# Patient Record
Sex: MALE | Race: WHITE | NOT HISPANIC OR LATINO | Employment: UNEMPLOYED | URBAN - METROPOLITAN AREA
[De-identification: names, ages, dates, MRNs, and addresses within clinical notes are randomized per-mention and may not be internally consistent; named-entity substitution may affect disease eponyms.]

---

## 2017-01-04 ENCOUNTER — HOSPITAL ENCOUNTER (EMERGENCY)
Facility: HOSPITAL | Age: 22
Discharge: HOME | End: 2017-01-04
Attending: EMERGENCY MEDICINE
Payer: MEDICAID

## 2017-01-04 VITALS
HEART RATE: 102 BPM | TEMPERATURE: 100 F | SYSTOLIC BLOOD PRESSURE: 126 MMHG | BODY MASS INDEX: 20.46 KG/M2 | OXYGEN SATURATION: 98 % | RESPIRATION RATE: 18 BRPM | HEIGHT: 68 IN | DIASTOLIC BLOOD PRESSURE: 65 MMHG | WEIGHT: 135 LBS

## 2017-01-04 DIAGNOSIS — J10.1 INFLUENZA A: Primary | ICD-10-CM

## 2017-01-04 LAB
FLUAV AG SPEC QL IA: POSITIVE
FLUBV AG SPEC QL IA: NEGATIVE
SPECIMEN SOURCE: ABNORMAL

## 2017-01-04 PROCEDURE — 99283 EMERGENCY DEPT VISIT LOW MDM: CPT

## 2017-01-04 PROCEDURE — 25000003 PHARM REV CODE 250: Performed by: PHYSICIAN ASSISTANT

## 2017-01-04 PROCEDURE — 87400 INFLUENZA A/B EACH AG IA: CPT | Mod: 59

## 2017-01-04 RX ORDER — OSELTAMIVIR PHOSPHATE 75 MG/1
75 CAPSULE ORAL 2 TIMES DAILY
Qty: 10 CAPSULE | Refills: 0 | Status: SHIPPED | OUTPATIENT
Start: 2017-01-04 | End: 2017-01-09

## 2017-01-04 RX ORDER — ONDANSETRON 4 MG/1
4 TABLET, ORALLY DISINTEGRATING ORAL EVERY 6 HOURS PRN
Qty: 15 TABLET | Refills: 0 | Status: SHIPPED | OUTPATIENT
Start: 2017-01-04 | End: 2018-02-01

## 2017-01-04 RX ORDER — IBUPROFEN 600 MG/1
600 TABLET ORAL
Status: COMPLETED | OUTPATIENT
Start: 2017-01-04 | End: 2017-01-04

## 2017-01-04 RX ORDER — ONDANSETRON 4 MG/1
4 TABLET, ORALLY DISINTEGRATING ORAL
Status: COMPLETED | OUTPATIENT
Start: 2017-01-04 | End: 2017-01-04

## 2017-01-04 RX ADMIN — IBUPROFEN 600 MG: 600 TABLET, FILM COATED ORAL at 07:01

## 2017-01-04 RX ADMIN — ONDANSETRON 4 MG: 4 TABLET, ORALLY DISINTEGRATING ORAL at 06:01

## 2017-01-04 NOTE — ED AVS SNAPSHOT
[facility]  [address]  [place] [address]               Patel FRAZIER [place]   2017  6:14 PM   ED    Description:  Male : 1995   Department:  [facility]           Your Care was Coordinated By:     Provider Role From To    Homero Summers Jr., MD Attending Provider 17 --    Nell Delacruz PA-C Physician Assistant 17 --      Reason for Visit     flu like symptoms           Diagnoses this Visit        Comments    Influenza A    -  Primary       ED Disposition     None           To Do List           Follow-up Information     Follow up with Torin Crane DO.    Specialty:  Internal Medicine    Contact information:     [facility] [address]  [phone]         These Medications        Disp Refills Start End    oseltamivir (TAMIFLU) 75 MG capsule 10 capsule 0 2017    Take 1 capsule (75 mg total) by mouth 2 (two) times daily. - Oral    Pharmacy: [facility] [address] [place] [address] [place] [address] [address] Ph #: [phone]       ondansetron (ZOFRAN-ODT) 4 MG TbDL 15 tablet 0 2017     Take 1 tablet (4 mg total) by mouth every 6 (six) hours as needed. - Oral    Pharmacy: [facility] [address] [place] [address] [place] [address] Ph #: [phone]         [facility]     [facility] Care [facility] -  Assistance  Registered nurses in the [facility] provide clinical advisement, health education, appointment booking, and other advisory services.  Call for this free service at [phone].             Medications           Message regarding Medications     Verify the changes and/or additions to your medication regime listed below are the same as discussed with your clinician today.  If any of these changes or additions are incorrect, please notify your healthcare provider.        START taking  "these NEW medications        Refills    oseltamivir (TAMIFLU) 75 MG capsule 0    Sig: Take 1 capsule (75 mg total) by mouth 2 (two) times daily.    Class: Print    Route: Oral    ondansetron (ZOFRAN-ODT) 4 MG TbDL 0    Sig: Take 1 tablet (4 mg total) by mouth every 6 (six) hours as needed.    Class: Print    Route: Oral      These medications were administered today        Dose Freq    ondansetron disintegrating tablet 4 mg 4 mg ED 1 Time    Sig: Take 1 tablet (4 mg total) by mouth ED 1 Time.    Class: Normal    Route: Oral    Cosign for Ordering: Required by Homero Summers Jr., MD    ibuprofen tablet 600 mg 600 mg ED 1 Time    Sig: Take 1 tablet (600 mg total) by mouth ED 1 Time.    Class: Normal    Route: Oral    Cosign for Ordering: Required by Homero Summers Jr., MD      STOP taking these medications     diclofenac (VOLTAREN) 75 MG EC tablet Take 75 mg by mouth 2 (two) times daily as needed.           Verify that the below list of medications is an accurate representation of the medications you are currently taking.  If none reported, the list may be blank. If incorrect, please contact your healthcare provider. Carry this list with you in case of emergency.           Current Medications     BUTALBIT/ACETAMIN/CAFF/CODEINE (BUTALBITAL-ACETAMINOP-CAF-COD ORAL) Take 1 tablet by mouth every 6 (six) hours as needed.    ibuprofen tablet 600 mg Take 1 tablet (600 mg total) by mouth ED 1 Time.    ondansetron (ZOFRAN-ODT) 4 MG TbDL Take 1 tablet (4 mg total) by mouth every 6 (six) hours as needed.    oseltamivir (TAMIFLU) 75 MG capsule Take 1 capsule (75 mg total) by mouth 2 (two) times daily.           Clinical Reference Information           Your Vitals Were     BP Pulse Temp Resp Height Weight    126/65 (BP Location: Right arm, Patient Position: Sitting, BP Method: Automatic) 102 100.2 °F (37.9 °C) (Oral) 18 5' 8" (1.727 m) 61.2 kg (135 lb)    SpO2 BMI             98% 20.53 kg/m2         Allergies as of " 1/4/2017        Reactions    Amoxicillin Rash      Immunizations Administered on Date of Encounter - 1/4/2017     None      ED Micro, Lab, POCT     Start Ordered       Status Ordering Provider    01/04/17 1825 01/04/17 1824  Influenza antigen Nasal Swab  STAT      Final result       ED Imaging Orders     None      Discharge References/Attachments     INFLUENZA  (ENGLISH)      Smoking Cessation     If you would like to quit smoking:   You may be eligible for free services if you are a [place] resident and started smoking cigarettes before September 1, 1988.  Call the Smoking Cessation [employer] ([facility]) toll free at ([phone] [phone] or ([phone] 610-1699.   Call 5-844-QUIT-NOW if you do not meet the above criteria.             [facility] complies with applicable Federal civil rights laws and does not discriminate on the basis of race, color, national origin, age, disability, or sex.        Language Assistance Services     ATTENTION: Language assistance services are available, free of charge. Please call [phone].      ATENCIÓN: Si habla español, tiene a ceja disposición servicios gratuitos de asistencia lingüística. Llame al [phone].     CHÚ Ý: N?u b?n nói Ti?ng Vi?t, có các d?ch v? h? tr? ngôn ng? mi?n phí dành cho b?n. G?i s? [phone].

## 2017-01-05 NOTE — ED PROVIDER NOTES
Encounter Date: 1/4/2017       History     Chief Complaint   Patient presents with    flu like symptoms     subjective fever, vomiting, nausea, generalized body aches; states was around cousin with flu; began this morning; also c/o dizziness     Review of patient's allergies indicates:   Allergen Reactions    Amoxicillin Rash     HPI Comments: Patel Ghotra, a 21 y.o. male that presents to the ED for cough and body aches since last and N/V that started this morning.  Only treatments tried have been OTC nausea medications with no improvement of symptoms.  Denies sick contact.  He did receive the flu vaccination.        Patient is a 21 y.o. male presenting with the following complaint: vomiting. The history is provided by the patient.   Emesis    The current episode started today. The problem occurs 5 - 10 times per day. The problem has been gradually worsening. The emesis has an appearance of stomach contents and bilious material. Associated symptoms include chills, cough, a fever and myalgias. Pertinent negatives include no abdominal pain, no diarrhea, no headaches and no URI.     Past Medical History   Diagnosis Date    Dental infection      No past medical history pertinent negatives.  Past Surgical History   Procedure Laterality Date    [place] tooth extraction       Family History   Problem Relation Age of Onset    Diabetes Mother     Heart disease Father     Drug abuse Father     Diabetes Maternal Grandmother     Hypertension Maternal Grandmother      Social History   Substance Use Topics    Smoking status: Current Some Day Smoker     Types: Cigarettes    Smokeless tobacco: Never Used      Comment: every other day    Alcohol use 0.0 oz/week     0 Standard drinks or equivalent per week      Comment: social     Review of Systems   Constitutional: Positive for chills and fever.   HENT: Positive for congestion.    Respiratory: Positive for cough.    Gastrointestinal: Positive for nausea and vomiting.  Negative for abdominal pain and diarrhea.   Musculoskeletal: Positive for myalgias. Negative for neck pain.   Skin: Negative for color change and rash.   Allergic/Immunologic: Negative for immunocompromised state.   Neurological: Negative for dizziness and headaches.   Hematological: Does not bruise/bleed easily.   Psychiatric/Behavioral: Negative for agitation and confusion.   All other systems reviewed and are negative.      Physical Exam   Initial Vitals   BP Pulse Resp Temp SpO2   01/04/17 1651 01/04/17 1651 01/04/17 1651 01/04/17 1651 01/04/17 1651   123/63 110 18 100.2 °F (37.9 °C) 100 %     Physical Exam    ED Course   Procedures  Labs Reviewed   INFLUENZA A AND B ANTIGEN - Abnormal; Notable for the following:        Result Value    Influenza A Ag, EIA Positive (*)     All other components within normal limits             Medical Decision Making:   Initial Assessment:   Patel Ghotra, a 21 y.o. male that presents to the ED for cough and body aches since last and N/V that started this morning.  Only treatments tried have been OTC nausea medications with no improvement of symptoms.  Denies sick contact.  He did receive the flu vaccination.    Differential Diagnosis:   Influenza, viral uri, gastroenteritis   ED Management:  Zofran given with improvement of nausea.  Patient tolerating PO. Flu A positive.  Motrin given with improvement of HA.  Information on symptomatic treatment provided.  Patient was given strict protocol for return to ED and verbalized understanding.    RX: zofran, Tamiflu                    ED Course     Clinical Impression:   The encounter diagnosis was Influenza A.          Nell Delacruz PA-C  01/04/17 1931

## 2017-01-09 ENCOUNTER — HOSPITAL ENCOUNTER (OUTPATIENT)
Dept: RADIOLOGY | Facility: HOSPITAL | Age: 22
Discharge: HOME | End: 2017-01-09
Attending: INTERNAL MEDICINE
Payer: MEDICAID

## 2017-01-09 ENCOUNTER — OFFICE VISIT (OUTPATIENT)
Dept: INTERNAL MEDICINE | Facility: CLINIC | Age: 22
End: 2017-01-09
Payer: MEDICAID

## 2017-01-09 ENCOUNTER — TELEPHONE (OUTPATIENT)
Dept: INTERNAL MEDICINE | Facility: CLINIC | Age: 22
End: 2017-01-09

## 2017-01-09 VITALS
HEART RATE: 93 BPM | SYSTOLIC BLOOD PRESSURE: 124 MMHG | HEIGHT: 68 IN | BODY MASS INDEX: 19.75 KG/M2 | DIASTOLIC BLOOD PRESSURE: 76 MMHG | RESPIRATION RATE: 16 BRPM | WEIGHT: 130.31 LBS | TEMPERATURE: 98 F

## 2017-01-09 DIAGNOSIS — M79.671 RIGHT FOOT PAIN: ICD-10-CM

## 2017-01-09 DIAGNOSIS — N32.81 URGENCY-FREQUENCY SYNDROME: Primary | ICD-10-CM

## 2017-01-09 DIAGNOSIS — F32.A DEPRESSION, UNSPECIFIED DEPRESSION TYPE: ICD-10-CM

## 2017-01-09 DIAGNOSIS — N34.3 DYSURIA-FREQUENCY SYNDROME: ICD-10-CM

## 2017-01-09 DIAGNOSIS — R35.0 INCREASED FREQUENCY OF URINATION: Primary | ICD-10-CM

## 2017-01-09 PROCEDURE — 99999 PR PBB SHADOW E&M-EST. PATIENT-LVL III: CPT | Mod: PBBFAC,,, | Performed by: INTERNAL MEDICINE

## 2017-01-09 PROCEDURE — 73630 X-RAY EXAM OF FOOT: CPT | Mod: TC,PO,RT

## 2017-01-09 PROCEDURE — 73630 X-RAY EXAM OF FOOT: CPT | Mod: 26,RT,, | Performed by: RADIOLOGY

## 2017-01-09 PROCEDURE — 99214 OFFICE O/P EST MOD 30 MIN: CPT | Mod: S$PBB,,, | Performed by: INTERNAL MEDICINE

## 2017-01-09 RX ORDER — ESCITALOPRAM OXALATE 20 MG/1
TABLET ORAL
Qty: 30 TABLET | Refills: 0 | Status: SHIPPED | OUTPATIENT
Start: 2017-01-09 | End: 2017-05-10

## 2017-01-09 NOTE — TELEPHONE ENCOUNTER
----- Message from Hanna Castillo sent at 2017  9:50 AM CST -----  Please enter new Urology order. Order from 6/15 is .    Thanks

## 2017-01-09 NOTE — PROGRESS NOTES
Subjective:       Patient ID: Patel Ghotra is a 21 y.o. male.    Chief Complaint: Foot Pain    HPI   Pt here for evaluation of 1 month of decreased urinary stream, increased urgency, slight dysuria which has been intermittent. He denies any penile discharge, hematuria, hx of STDs.     Pt also admits to recurrent symptoms of depression related to his job and family stressors. Associated symptoms of anhedonia, excessive worrying. He denies any SI/HI. He would like to take to a therapist.   Review of Systems   Constitutional: Negative for activity change, appetite change, chills, diaphoresis, fatigue, fever and unexpected weight change.   HENT: Negative for postnasal drip, rhinorrhea, sinus pressure, sneezing, sore throat, trouble swallowing and voice change.    Respiratory: Negative for cough, shortness of breath and wheezing.    Cardiovascular: Negative for chest pain, palpitations and leg swelling.   Gastrointestinal: Negative for abdominal pain, blood in stool, constipation, diarrhea, nausea and vomiting.   Genitourinary: Positive for decreased urine volume, dysuria, frequency and urgency.   Musculoskeletal: Negative for arthralgias and myalgias.   Skin: Negative for rash and wound.   Allergic/Immunologic: Negative for environmental allergies and food allergies.   Hematological: Negative for adenopathy. Does not bruise/bleed easily.   Psychiatric/Behavioral: Positive for dysphoric mood.       Objective:      Physical Exam   Constitutional: He is oriented to person, place, and time. He appears well-developed and well-nourished. No distress.   HENT:   Head: Normocephalic and atraumatic.   Eyes: Conjunctivae and EOM are normal. Pupils are equal, round, and reactive to light. Right eye exhibits no discharge. Left eye exhibits no discharge. No scleral icterus.   Neck: Normal range of motion. Neck supple. No JVD present.   Cardiovascular: Normal rate, regular rhythm and normal heart sounds.    No murmur  heard.  Pulmonary/Chest: Effort normal and breath sounds normal. No respiratory distress. He has no wheezes. He has no rales.   Abdominal: Soft. Bowel sounds are normal. There is no tenderness.   Musculoskeletal: He exhibits no edema.        Feet:    Lymphadenopathy:     He has no cervical adenopathy.   Neurological: He is alert and oriented to person, place, and time.   Skin: Skin is warm and dry. No rash noted. He is not diaphoretic. No pallor.       Assessment:       1. Increased frequency of urination    2. Dysuria-frequency syndrome    3. Depression, unspecified depression type    4. Right foot pain        Plan:    1/2. Check UA/Urine Cx and for Chlamydia/gonorrhea          Referral to Urology    3. Rx Lexapro 20 mg daily       Referral to Psychology    4. X-ray foot       Continue NSAIDs PRN

## 2017-02-10 ENCOUNTER — TELEPHONE (OUTPATIENT)
Dept: UROLOGY | Facility: CLINIC | Age: 22
End: 2017-02-10

## 2017-05-10 ENCOUNTER — OFFICE VISIT (OUTPATIENT)
Dept: INTERNAL MEDICINE | Facility: CLINIC | Age: 22
End: 2017-05-10
Payer: MEDICAID

## 2017-05-10 VITALS
SYSTOLIC BLOOD PRESSURE: 112 MMHG | DIASTOLIC BLOOD PRESSURE: 62 MMHG | HEIGHT: 68 IN | WEIGHT: 140.44 LBS | RESPIRATION RATE: 16 BRPM | HEART RATE: 64 BPM | TEMPERATURE: 98 F | BODY MASS INDEX: 21.28 KG/M2

## 2017-05-10 DIAGNOSIS — F32.0 MILD SINGLE CURRENT EPISODE OF MAJOR DEPRESSIVE DISORDER: Primary | ICD-10-CM

## 2017-05-10 PROCEDURE — 99213 OFFICE O/P EST LOW 20 MIN: CPT | Mod: S$PBB,,, | Performed by: INTERNAL MEDICINE

## 2017-05-10 PROCEDURE — 99213 OFFICE O/P EST LOW 20 MIN: CPT | Mod: PBBFAC,PO | Performed by: INTERNAL MEDICINE

## 2017-05-10 PROCEDURE — 99999 PR PBB SHADOW E&M-EST. PATIENT-LVL III: CPT | Mod: PBBFAC,,, | Performed by: INTERNAL MEDICINE

## 2017-05-10 RX ORDER — BUPROPION HYDROCHLORIDE 150 MG/1
150 TABLET, EXTENDED RELEASE ORAL 2 TIMES DAILY
Qty: 60 TABLET | Refills: 0 | Status: SHIPPED | OUTPATIENT
Start: 2017-05-10 | End: 2017-06-14

## 2017-05-10 NOTE — PROGRESS NOTES
Subjective:       Patient ID: Patel Ghotra is a 21 y.o. male.    Chief Complaint: Follow-up    HPI   Pt here c/o generalized fatigue which has been present for the last few weeks. Associated symptoms of mood instability, excessive worrying, increased stress. He denies any fever/chills, weight loss, abdominal pain or recent illness. Pt was previously on Lexapro which he stopped.   Review of Systems   Constitutional: Positive for activity change. Negative for unexpected weight change.   HENT: Negative for hearing loss, rhinorrhea and trouble swallowing.    Eyes: Negative for discharge and visual disturbance.   Respiratory: Negative for chest tightness and wheezing.    Cardiovascular: Negative for chest pain and palpitations.   Gastrointestinal: Negative for blood in stool, constipation, diarrhea and vomiting.   Endocrine: Negative for polydipsia and polyuria.   Genitourinary: Negative for difficulty urinating, hematuria and urgency.   Musculoskeletal: Negative for arthralgias and joint swelling.   Neurological: Negative for weakness and headaches.   Psychiatric/Behavioral: Positive for dysphoric mood. Negative for confusion, self-injury and suicidal ideas. The patient is nervous/anxious.        Objective:      Physical Exam   Constitutional: He is oriented to person, place, and time. He appears well-developed and well-nourished. No distress.   HENT:   Head: Normocephalic and atraumatic.   Eyes: Conjunctivae and EOM are normal. Pupils are equal, round, and reactive to light. Right eye exhibits no discharge. Left eye exhibits no discharge. No scleral icterus.   Neck: Normal range of motion. Neck supple. No JVD present.   Cardiovascular: Normal rate, regular rhythm and normal heart sounds.    No murmur heard.  Pulmonary/Chest: Effort normal and breath sounds normal. No respiratory distress. He has no wheezes. He has no rales.   Abdominal: Soft. Bowel sounds are normal. There is no tenderness.   Musculoskeletal:  He exhibits no edema.   Lymphadenopathy:     He has no cervical adenopathy.   Neurological: He is alert and oriented to person, place, and time.   Skin: Skin is warm and dry. No rash noted. He is not diaphoretic. No pallor.       Assessment:       1. Mild single current episode of major depressive disorder        Plan:    1. Rx Wellbutrin  mg BID   2. F/u in 1 month for depression

## 2017-05-26 ENCOUNTER — PATIENT MESSAGE (OUTPATIENT)
Dept: INTERNAL MEDICINE | Facility: CLINIC | Age: 22
End: 2017-05-26

## 2017-06-14 ENCOUNTER — OFFICE VISIT (OUTPATIENT)
Dept: INTERNAL MEDICINE | Facility: CLINIC | Age: 22
End: 2017-06-14
Payer: MEDICAID

## 2017-06-14 VITALS
BODY MASS INDEX: 21.15 KG/M2 | HEART RATE: 88 BPM | HEIGHT: 68 IN | DIASTOLIC BLOOD PRESSURE: 61 MMHG | WEIGHT: 139.56 LBS | SYSTOLIC BLOOD PRESSURE: 116 MMHG | RESPIRATION RATE: 16 BRPM | TEMPERATURE: 98 F

## 2017-06-14 DIAGNOSIS — F32.0 MILD SINGLE CURRENT EPISODE OF MAJOR DEPRESSIVE DISORDER: Primary | ICD-10-CM

## 2017-06-14 DIAGNOSIS — M25.541 JOINT PAIN IN FINGERS OF RIGHT HAND: ICD-10-CM

## 2017-06-14 PROCEDURE — 99999 PR PBB SHADOW E&M-EST. PATIENT-LVL III: CPT | Mod: PBBFAC,,, | Performed by: INTERNAL MEDICINE

## 2017-06-14 PROCEDURE — 99213 OFFICE O/P EST LOW 20 MIN: CPT | Mod: PBBFAC,PO | Performed by: INTERNAL MEDICINE

## 2017-06-14 PROCEDURE — 99214 OFFICE O/P EST MOD 30 MIN: CPT | Mod: S$PBB,,, | Performed by: INTERNAL MEDICINE

## 2017-06-14 RX ORDER — MELOXICAM 15 MG/1
15 TABLET ORAL DAILY
Qty: 30 TABLET | Refills: 3 | Status: SHIPPED | OUTPATIENT
Start: 2017-06-14 | End: 2017-12-02 | Stop reason: SDUPTHER

## 2017-06-14 NOTE — PROGRESS NOTES
Subjective:       Patient ID: Patel Ghotra is a 21 y.o. male.    Chief Complaint: Follow-up    HPI   Pt with MDD is here for 1 month f/u. He was started on Wellbutrin which he stopped 2/2 SE's after one week. Pt overall is felling better off the medication.     Pt also c/o right 3rd MCP joint pain which started after hitting a wall. Associated symptoms of swelling and the pain is described as sharp when trying to make a fist. No decreased ROM.   Review of Systems   Constitutional: Negative for activity change, appetite change, chills, diaphoresis, fatigue, fever and unexpected weight change.   HENT: Negative for postnasal drip, rhinorrhea, sinus pressure, sneezing, sore throat, trouble swallowing and voice change.    Respiratory: Negative for cough, shortness of breath and wheezing.    Cardiovascular: Negative for chest pain, palpitations and leg swelling.   Gastrointestinal: Negative for abdominal pain, blood in stool, constipation, diarrhea, nausea and vomiting.   Genitourinary: Negative for dysuria.   Musculoskeletal: Negative for arthralgias and myalgias.   Skin: Negative for rash and wound.   Allergic/Immunologic: Negative for environmental allergies and food allergies.   Hematological: Negative for adenopathy. Does not bruise/bleed easily.   Psychiatric/Behavioral: Positive for dysphoric mood.       Objective:      Physical Exam   Constitutional: He is oriented to person, place, and time. He appears well-developed and well-nourished. No distress.   HENT:   Head: Normocephalic and atraumatic.   Eyes: Conjunctivae and EOM are normal. Pupils are equal, round, and reactive to light. Right eye exhibits no discharge. Left eye exhibits no discharge. No scleral icterus.   Neck: Normal range of motion. Neck supple. No JVD present.   Cardiovascular: Normal rate, regular rhythm and normal heart sounds.    No murmur heard.  Pulmonary/Chest: Effort normal and breath sounds normal. No respiratory distress. He has  no wheezes. He has no rales.   Musculoskeletal: He exhibits no edema.        Hands:  Mild swelling, normal ROM   Lymphadenopathy:     He has no cervical adenopathy.   Neurological: He is alert and oriented to person, place, and time. He has normal reflexes.   Skin: Skin is warm and dry. No rash noted. He is not diaphoretic. No pallor.       Assessment:       1. Mild single current episode of major depressive disorder    2. Joint pain in fingers of right hand        Plan:    1. Stable off meds   2. Rx Mobic 15 mg daily PRN

## 2017-12-04 RX ORDER — MELOXICAM 15 MG/1
TABLET ORAL
Qty: 30 TABLET | Refills: 3 | Status: SHIPPED | OUTPATIENT
Start: 2017-12-04 | End: 2018-05-31

## 2018-02-01 ENCOUNTER — OFFICE VISIT (OUTPATIENT)
Dept: INTERNAL MEDICINE | Facility: CLINIC | Age: 23
End: 2018-02-01
Payer: MEDICAID

## 2018-02-01 VITALS
WEIGHT: 142 LBS | DIASTOLIC BLOOD PRESSURE: 62 MMHG | TEMPERATURE: 98 F | HEART RATE: 76 BPM | HEIGHT: 68 IN | SYSTOLIC BLOOD PRESSURE: 114 MMHG | BODY MASS INDEX: 21.52 KG/M2

## 2018-02-01 DIAGNOSIS — G43.009 MIGRAINE WITHOUT AURA AND WITHOUT STATUS MIGRAINOSUS, NOT INTRACTABLE: ICD-10-CM

## 2018-02-01 DIAGNOSIS — F41.9 ANXIETY: ICD-10-CM

## 2018-02-01 DIAGNOSIS — Z00.00 ANNUAL PHYSICAL EXAM: Primary | ICD-10-CM

## 2018-02-01 PROBLEM — F32.0 MILD SINGLE CURRENT EPISODE OF MAJOR DEPRESSIVE DISORDER: Status: RESOLVED | Noted: 2017-05-10 | Resolved: 2018-02-01

## 2018-02-01 PROCEDURE — 99395 PREV VISIT EST AGE 18-39: CPT | Mod: S$PBB,,, | Performed by: INTERNAL MEDICINE

## 2018-02-01 PROCEDURE — 99999 PR PBB SHADOW E&M-EST. PATIENT-LVL III: CPT | Mod: PBBFAC,,, | Performed by: INTERNAL MEDICINE

## 2018-02-01 PROCEDURE — 99213 OFFICE O/P EST LOW 20 MIN: CPT | Mod: PBBFAC,PO | Performed by: INTERNAL MEDICINE

## 2018-02-01 RX ORDER — HYDROXYZINE HYDROCHLORIDE 25 MG/1
25 TABLET, FILM COATED ORAL 3 TIMES DAILY PRN
Qty: 60 TABLET | Refills: 2 | Status: SHIPPED | OUTPATIENT
Start: 2018-02-01 | End: 2020-11-10

## 2018-02-01 NOTE — PROGRESS NOTES
Subjective:       Patient ID: Patel Ghotra is a 22 y.o. male.    Chief Complaint: Annual Exam    HPI   22 y.o. Male here for annual exam.      Cholesterol: (normal)  Vaccines: Influenza (2016); Tetanus (2015)   Eye exam: 2014     Mobility: normal  Falls: no     Exercise: cardio  Diet: regular     Past Medical History:  Dental infection   Past Surgical History:  WISDOM TOOTH EXTRACTION   Social History  Marital Status: Single Spouse Name:   Years of Education: Number of children:      Occupational History  Occupation Employer Comment   Student      Social History Main Topics  Smoking Status: Current Every Day Smoker Packs/Day: 0.00 Years:   Types: Cigarettes  Smokeless Status: Not on file   Alcohol Use: Yes   Comment: social  Drug Use: No   Sexual Activity: Yes Partners with: Female  Birth Control/Protection: Condom     Review of Systems   Constitutional: Negative for activity change, appetite change, chills, diaphoresis, fatigue, fever and unexpected weight change.   HENT: Negative for congestion, mouth sores, postnasal drip, rhinorrhea, sinus pressure, sneezing, sore throat, trouble swallowing and voice change.    Eyes: Negative for discharge, itching and visual disturbance.   Respiratory: Negative for cough, chest tightness, shortness of breath and wheezing.    Cardiovascular: Negative for chest pain, palpitations and leg swelling.   Gastrointestinal: Negative for abdominal pain, blood in stool, constipation, diarrhea, nausea and vomiting.   Endocrine: Negative for cold intolerance and heat intolerance.   Genitourinary: Negative for difficulty urinating, dysuria, flank pain, hematuria and urgency.   Musculoskeletal: Negative for arthralgias, back pain, myalgias and neck pain.   Skin: Negative for rash and wound.   Allergic/Immunologic: Negative for environmental allergies and food allergies.   Neurological: Positive for headaches. Negative for dizziness, tremors, seizures, syncope and weakness.    Hematological: Negative for adenopathy. Does not bruise/bleed easily.   Psychiatric/Behavioral: Negative for confusion, self-injury, sleep disturbance and suicidal ideas. The patient is nervous/anxious.        Objective:      Physical Exam   Constitutional: He is oriented to person, place, and time. He appears well-developed and well-nourished. No distress.   HENT:   Head: Normocephalic and atraumatic.   Right Ear: External ear normal.   Left Ear: External ear normal.   Nose: Nose normal.   Mouth/Throat: Oropharynx is clear and moist. No oropharyngeal exudate.   Eyes: Conjunctivae and EOM are normal. Pupils are equal, round, and reactive to light. Right eye exhibits no discharge. Left eye exhibits no discharge. No scleral icterus.   Neck: Normal range of motion. Neck supple. No JVD present. No thyromegaly present.   Cardiovascular: Normal rate, regular rhythm, normal heart sounds and intact distal pulses.    No murmur heard.  Pulmonary/Chest: Effort normal and breath sounds normal. No respiratory distress. He has no wheezes. He has no rales.   Abdominal: Soft. Bowel sounds are normal. He exhibits no distension. There is no tenderness. There is no guarding.   Musculoskeletal: He exhibits no edema.   Lymphadenopathy:     He has no cervical adenopathy.   Neurological: He is alert and oriented to person, place, and time. No cranial nerve deficit. Coordination normal.   Skin: Skin is warm and dry. No rash noted. He is not diaphoretic. No pallor.   Psychiatric: He has a normal mood and affect. Judgment normal.       Assessment:       1. Annual physical exam    2. Migraine without aura and without status migrainosus, not intractable    3. Anxiety        Plan:    Blood work reviewed with pt   Vaccines: Influenza (2017); Tetanus (2015)    Eye exam: 2014      1. Migraines- stable on Fioricet PRN   2. Anxiety- Rx Atarax 25 mg TID PRN   3. F/u in 1 yr or PRN

## 2018-02-10 ENCOUNTER — LAB VISIT (OUTPATIENT)
Dept: LAB | Facility: HOSPITAL | Age: 23
End: 2018-02-10
Attending: INTERNAL MEDICINE
Payer: MEDICAID

## 2018-02-10 DIAGNOSIS — Z00.00 ANNUAL PHYSICAL EXAM: ICD-10-CM

## 2018-02-10 LAB
ALBUMIN SERPL BCP-MCNC: 4.3 G/DL
ALP SERPL-CCNC: 97 U/L
ALT SERPL W/O P-5'-P-CCNC: 19 U/L
ANION GAP SERPL CALC-SCNC: 9 MMOL/L
AST SERPL-CCNC: 27 U/L
BASOPHILS # BLD AUTO: 0.03 K/UL
BASOPHILS NFR BLD: 0.6 %
BILIRUB SERPL-MCNC: 1.2 MG/DL
BUN SERPL-MCNC: 15 MG/DL
CALCIUM SERPL-MCNC: 9.9 MG/DL
CHLORIDE SERPL-SCNC: 100 MMOL/L
CHOLEST SERPL-MCNC: 168 MG/DL
CHOLEST/HDLC SERPL: 3.2 {RATIO}
CO2 SERPL-SCNC: 29 MMOL/L
CREAT SERPL-MCNC: 1.1 MG/DL
DIFFERENTIAL METHOD: ABNORMAL
EOSINOPHIL # BLD AUTO: 0.2 K/UL
EOSINOPHIL NFR BLD: 3.7 %
ERYTHROCYTE [DISTWIDTH] IN BLOOD BY AUTOMATED COUNT: 12.4 %
EST. GFR  (AFRICAN AMERICAN): >60 ML/MIN/1.73 M^2
EST. GFR  (NON AFRICAN AMERICAN): >60 ML/MIN/1.73 M^2
GLUCOSE SERPL-MCNC: 110 MG/DL
HCT VFR BLD AUTO: 42.8 %
HDLC SERPL-MCNC: 52 MG/DL
HDLC SERPL: 31 %
HGB BLD-MCNC: 14.7 G/DL
IMM GRANULOCYTES # BLD AUTO: 0.01 K/UL
IMM GRANULOCYTES NFR BLD AUTO: 0.2 %
LDLC SERPL CALC-MCNC: 101.8 MG/DL
LYMPHOCYTES # BLD AUTO: 2.5 K/UL
LYMPHOCYTES NFR BLD: 50.4 %
MCH RBC QN AUTO: 30.2 PG
MCHC RBC AUTO-ENTMCNC: 34.3 G/DL
MCV RBC AUTO: 88 FL
MONOCYTES # BLD AUTO: 0.4 K/UL
MONOCYTES NFR BLD: 8.6 %
NEUTROPHILS # BLD AUTO: 1.8 K/UL
NEUTROPHILS NFR BLD: 36.5 %
NONHDLC SERPL-MCNC: 116 MG/DL
NRBC BLD-RTO: 0 /100 WBC
PLATELET # BLD AUTO: 245 K/UL
PMV BLD AUTO: 10.3 FL
POTASSIUM SERPL-SCNC: 4.2 MMOL/L
PROT SERPL-MCNC: 7.7 G/DL
RBC # BLD AUTO: 4.86 M/UL
SODIUM SERPL-SCNC: 138 MMOL/L
TRIGL SERPL-MCNC: 71 MG/DL
TSH SERPL DL<=0.005 MIU/L-ACNC: 0.62 UIU/ML
WBC # BLD AUTO: 4.9 K/UL

## 2018-02-10 PROCEDURE — 80053 COMPREHEN METABOLIC PANEL: CPT

## 2018-02-10 PROCEDURE — 86703 HIV-1/HIV-2 1 RESULT ANTBDY: CPT

## 2018-02-10 PROCEDURE — 86592 SYPHILIS TEST NON-TREP QUAL: CPT

## 2018-02-10 PROCEDURE — 80074 ACUTE HEPATITIS PANEL: CPT

## 2018-02-10 PROCEDURE — 36415 COLL VENOUS BLD VENIPUNCTURE: CPT | Mod: PO

## 2018-02-10 PROCEDURE — 85025 COMPLETE CBC W/AUTO DIFF WBC: CPT

## 2018-02-10 PROCEDURE — 86694 HERPES SIMPLEX NES ANTBDY: CPT | Mod: 59

## 2018-02-10 PROCEDURE — 84443 ASSAY THYROID STIM HORMONE: CPT

## 2018-02-10 PROCEDURE — 80061 LIPID PANEL: CPT

## 2018-02-10 PROCEDURE — 86694 HERPES SIMPLEX NES ANTBDY: CPT

## 2018-02-12 LAB
HAV IGM SERPL QL IA: NEGATIVE
HBV CORE IGM SERPL QL IA: NEGATIVE
HBV SURFACE AG SERPL QL IA: NEGATIVE
HCV AB SERPL QL IA: NEGATIVE
HIV 1+2 AB+HIV1 P24 AG SERPL QL IA: NEGATIVE
RPR SER QL: NORMAL

## 2018-02-14 LAB — HSV AB, IGM BY EIA: REACTIVE

## 2018-02-15 LAB — HSV AB, IGM BY IFA: NEGATIVE

## 2018-05-31 ENCOUNTER — OFFICE VISIT (OUTPATIENT)
Dept: INTERNAL MEDICINE | Facility: CLINIC | Age: 23
End: 2018-05-31
Payer: MEDICAID

## 2018-05-31 ENCOUNTER — HOSPITAL ENCOUNTER (OUTPATIENT)
Dept: RADIOLOGY | Facility: HOSPITAL | Age: 23
Discharge: HOME | End: 2018-05-31
Attending: INTERNAL MEDICINE
Payer: MEDICAID

## 2018-05-31 VITALS
HEART RATE: 62 BPM | BODY MASS INDEX: 20.89 KG/M2 | SYSTOLIC BLOOD PRESSURE: 118 MMHG | TEMPERATURE: 98 F | WEIGHT: 137.81 LBS | HEIGHT: 68 IN | DIASTOLIC BLOOD PRESSURE: 62 MMHG

## 2018-05-31 DIAGNOSIS — G89.29 CHRONIC RIGHT-SIDED LOW BACK PAIN WITHOUT SCIATICA: Primary | ICD-10-CM

## 2018-05-31 DIAGNOSIS — G89.29 CHRONIC RIGHT-SIDED LOW BACK PAIN WITHOUT SCIATICA: ICD-10-CM

## 2018-05-31 DIAGNOSIS — M62.830 MUSCLE SPASM OF BACK: ICD-10-CM

## 2018-05-31 DIAGNOSIS — M54.50 CHRONIC RIGHT-SIDED LOW BACK PAIN WITHOUT SCIATICA: Primary | ICD-10-CM

## 2018-05-31 DIAGNOSIS — M54.50 CHRONIC RIGHT-SIDED LOW BACK PAIN WITHOUT SCIATICA: ICD-10-CM

## 2018-05-31 PROCEDURE — 99214 OFFICE O/P EST MOD 30 MIN: CPT | Mod: S$PBB,,, | Performed by: INTERNAL MEDICINE

## 2018-05-31 PROCEDURE — 99213 OFFICE O/P EST LOW 20 MIN: CPT | Mod: PBBFAC,PO | Performed by: INTERNAL MEDICINE

## 2018-05-31 PROCEDURE — 99999 PR PBB SHADOW E&M-EST. PATIENT-LVL III: CPT | Mod: PBBFAC,,, | Performed by: INTERNAL MEDICINE

## 2018-05-31 PROCEDURE — 72110 X-RAY EXAM L-2 SPINE 4/>VWS: CPT | Mod: 26,,, | Performed by: RADIOLOGY

## 2018-05-31 PROCEDURE — 72110 X-RAY EXAM L-2 SPINE 4/>VWS: CPT | Mod: TC,PO

## 2018-05-31 RX ORDER — NAPROXEN 500 MG/1
500 TABLET ORAL 2 TIMES DAILY WITH MEALS
Qty: 60 TABLET | Refills: 3 | Status: SHIPPED | OUTPATIENT
Start: 2018-05-31 | End: 2018-06-30

## 2018-05-31 NOTE — PROGRESS NOTES
Subjective:       Patient ID: Patel Ghotra is a 22 y.o. male.    Chief Complaint: Low-back Pain    HPI   Pt also c/o intermittent right sided low back pain described as sharp/stabbing at times. There is no radiation of the pain down the legs. Some relief with Naproxen. No hx of trauma to the area.   Review of Systems   Constitutional: Positive for activity change. Negative for appetite change, chills, diaphoresis, fatigue, fever and unexpected weight change.   HENT: Positive for rhinorrhea. Negative for hearing loss, postnasal drip, sinus pressure, sneezing, sore throat, trouble swallowing and voice change.    Eyes: Negative for discharge and visual disturbance.   Respiratory: Negative for cough, chest tightness, shortness of breath and wheezing.    Cardiovascular: Negative for chest pain, palpitations and leg swelling.   Gastrointestinal: Positive for constipation. Negative for abdominal pain, blood in stool, diarrhea, nausea and vomiting.   Endocrine: Negative for polydipsia and polyuria.   Genitourinary: Negative for difficulty urinating, dysuria, hematuria and urgency.   Musculoskeletal: Positive for arthralgias. Negative for joint swelling, myalgias and neck pain.   Skin: Negative for rash and wound.   Allergic/Immunologic: Negative for environmental allergies and food allergies.   Neurological: Positive for weakness and headaches.   Hematological: Negative for adenopathy. Does not bruise/bleed easily.   Psychiatric/Behavioral: Positive for dysphoric mood. Negative for confusion.       Objective:      Physical Exam   Constitutional: He is oriented to person, place, and time. He appears well-developed and well-nourished. No distress.   HENT:   Head: Normocephalic and atraumatic.   Eyes: Conjunctivae and EOM are normal. Pupils are equal, round, and reactive to light. Right eye exhibits no discharge. Left eye exhibits no discharge. No scleral icterus.   Neck: Normal range of motion. Neck supple. No JVD  present.   Cardiovascular: Normal rate, regular rhythm and normal heart sounds.    No murmur heard.  Pulmonary/Chest: Effort normal and breath sounds normal. No respiratory distress. He has no wheezes. He has no rales.   Musculoskeletal: He exhibits no edema.        Back:    Lymphadenopathy:     He has no cervical adenopathy.   Neurological: He is alert and oriented to person, place, and time.   Skin: Skin is warm and dry. No rash noted. He is not diaphoretic. No pallor.       Assessment:       1. Chronic right-sided low back pain without sciatica    2. Muscle spasm of back        Plan:    1. X-ray Lumbar spine       Rx Naproxen 500 mg BID PRN       Referral to PT

## 2018-06-01 ENCOUNTER — CLINICAL SUPPORT (OUTPATIENT)
Dept: REHABILITATION | Facility: HOSPITAL | Age: 23
End: 2018-06-01
Attending: INTERNAL MEDICINE
Payer: MEDICAID

## 2018-06-01 DIAGNOSIS — R53.1 WEAKNESS: ICD-10-CM

## 2018-06-01 DIAGNOSIS — M53.3 SACROILIAC DYSFUNCTION: ICD-10-CM

## 2018-06-01 PROCEDURE — 97162 PT EVAL MOD COMPLEX 30 MIN: CPT | Mod: PO

## 2018-06-01 PROCEDURE — 97110 THERAPEUTIC EXERCISES: CPT | Mod: PO

## 2018-06-01 NOTE — PLAN OF CARE
"Physical Therapy Evaluation    Name: Patel Owusu [facility] Number: [id number]    Diagnosis:   Encounter Diagnoses   Name Primary?    Weakness     Sacroiliac dysfunction      Physician: Torin Crane, *  Treatment Orders: PT Eval and Treat    History     Past Medical History:   Diagnosis Date    Dental infection      Current Outpatient Prescriptions   Medication Sig    BUTALBIT/ACETAMIN/CAFF/CODEINE (BUTALBITAL-ACETAMINOP-CAF-COD ORAL) Take 1 tablet by mouth every 6 (six) hours as needed.    hydrOXYzine HCl (ATARAX) 25 MG tablet Take 1 tablet (25 mg total) by mouth 3 (three) times daily as needed for Anxiety.    naproxen (NAPROSYN) 500 MG tablet Take 1 tablet (500 mg total) by mouth 2 (two) times daily with meals.     No current facility-administered medications for this visit.      Review of patient's allergies indicates:   Allergen Reactions    Amoxicillin Rash       Evaluation Date: 6/1/18  Visit # authorized: 1/  Authorization period: 6/30/18  Plan of care expiration: 8/1/18    Subjective     History of present condition:  Patel is a 22 y.o. male that presents to [facility] secondary to chronic low back pain. Injury/surgery occurred approximately: ~6 years ago. Pt. presents with the following co-morbidities and personal factors that directly impact his plan of care: chronicity of condition.        Precautions: none    Onset/IZZY: gradual: Pt. has a chronic hx of lumbar spine pain of that started in high school more than likely related to carrying heavy drums on his shoulders daily. Pt. also recalls a fall at that time landing on spine causing him to jar his spine. Pt. went to the ER and was dx with a cervical sprain per pt. report. Current symptoms include with back stiffness/pain in the AM, pain gets progressively worse throughout the day, and exacerbates at the end of the day causing a "searing pain."  Red Flags:  · Bowel/bladder symptoms (urinary retention/fecal " incontinence)? No  · Recent weight loss? No.  · Constant/Night pain that is unchanging with change of position? No.  · PMH of CA? No.   · Numbness or Tingling? in low back intermittently with increased stress.    Aggravating factors: high level of stress, movement can causing a pinching sensation, bending/stooping, lifting mod. to heavy objects, sleep disturbance   Relieving factors: NSAIDs and rest  Pain Scale: Patel rates pain on a scale of 0-10 to be 3 at currently; 0 at best; 10 at worst.    Diagnostic tests: Lumbar x-ray revealed unremarkable.    PLOF: work related activities, gym workout 1x/wk: cardio, UE/LE weights; lives with roommates  DME own/use: none  Occupation: Pt works as a  and job related duties include lifting ice buckets, plates, running back/forth, carrying a tray one side of body (tries to alternate sides)    Previous treatment: medication.  ADLs: Pt has a decrease ability to perform ADLs such as see above.    Patient Goals: Pt would like to decrease pain and increase function so he can return to his normal daily activities with less stiffness, become more active/exercise, and return to PLOF.    Objective     Observation: standing: right ilium higher than left; supine: right ilial anterior/left ilial posterior    Posture: subcranial hyperextension, cervical flattened lordosis, FHP, mild Tspine kyphosis, rolled anterior/IR shoulders, shoulder protraction    Lumbar ROM: (measured in degrees)    Degrees Quality   Flexion WFL   fattening L4/5 L5/S1 repeated notes improvement in pain   Extension WFL   ERP, increases with repetition   Left Side Bending 50% right LBP   Right Side Bending WFL    Left rotation 50% decreased PSIS depressoin   Right Rotation 75%    Dermatomes: (impaired/normal)     RLE LLE   L2 Intact Intact   L3 Intact Intact   L4 Intact Intact   L5 Intact Intact   S1 Intact Intact     Reflexes: L3/S1: 2 bilaterally    Lower Extremity Strength (graded 0-5 out of 5)    RLE LLE   Hip flexion: 4-/5 4+/5   Hip ER 4+/5 4-/5   Hip IR 4+/5 4-/5   Knee extension: 4+/5 4+/5   Ankle dorsiflexion: 5/5 5/5   Great toe extension: 4-/5 4-/5   Posterior fibers of Gluteus medius 4+/5 4+/5   Knee flexion 4+/5 4+/5   Ankle plantarflexion 5/5 5/5   Hip extension: 3-/5 3/5     Special Tests: ((+): pos.; (-): neg.)   · Quadrant test: -  · SLR Test: bilateral 60 deg.   · Bridge Test: + left  · Pirformis Test: -; mod. tightness left hip, mild left    Flexibility:   · Popliteal Angle: R = 55 degrees ; L = 55 degrees    Palpation for condition:   · Position: see observation  · Warmth: normal  · Swelling: none  · Texture: hypertonic bilateral hamstrings and piriformis left greater than right     Joint Mobility: (graded 0-6 out of 6) sacrum flexion/extension: 2/6; bilateral lumbar p/a: 2/6; Tspine: 2/6    Functional Status Measures:    Intake Score     Pts Physical FS Primary Measure      NP due to pt. being 30 min. late                          Risk Adjustment Statistical FOTO     NP        PT reviewed FOTO scores for Patel Ghotra on 06/01/2018.   FOTO scores were entered into [id number] - see media section.    History  Co-morbidities and personal factors that may impact the plan of care Examination  Body Structures and Functions, activity limitations and participation restrictions that may impact the plan of care Clinical Presentation   Decision Making/ Complexity Score   Co-morbidities:   sacroiliac dysfunction and chronicity of conditiion            Personal Factors:   none Body Regions: SIJ dysfunction and lumbar spine    Body Systems: Decreased AROM; pelvic dysfunction; core hip lumbopelvic weakness; poor posture; pain with transitional activities, decrease exercise ability, and pain with ADLs.     Activity limitations: bending/stooping, prolonged sitting/standing, lifting, work related activities, and sleep disturbance    Participation Restrictions: leisure work out evolving with changing  "clinical characteristics   moderate     Clinical Presentation/complexity category  Moderate complexity category: pt. has 1-2 personal factors and/or co-morbidities directly  impacting POC, 3 or more body system impairments/functional limitations/participation restrictions; as well as, condition is evolving with changing clinical characteristics.    PT Evaluation Completed? Yes  Discussed Plan of Care with patient: Yes    TREATMENT:  Therapeutic exercise: Patel received therapeutic exercises to develop strength and endurance, flexibility for 10 minutes including: View at "[email or web address]" using code: [email or web address]  *Pt was advised to perform these exercises free of pain, and discontinue use if symptoms persist/worsen.    Pt. Education: Instructed pt. regarding:body mechanics, posture, activity modification/avoidance, and proper technique with all exercises. Pt. to demonstrate good understanding of the education provided. Patel demonstrated good return demonstration of activities. No cultural, environmental, or spiritual barriers identified to treatment or learning.    Medical necessity is demonstrated by the following IMPAIRMENTS/PROBLEM LIST:   1) Pain limiting function   2) Posture dysfunction   3) Core/Lumbar/LE weakness   4) Decreased thoracic/lumbar joint mobility   5) Decreased Lumbar ROM   6) Decreased soft tissue extensibility/fascia restriction   7) Decreased LE flexibility: hamstrings and piriformis left greater than right   8) Lack of HEP   9) LE paresthesia intermittently right   10) Sacroiliac dysfunction    GOALS:   Short Term Goals:  4 weeks  1. Report decreased lumbar pain </= 5/10 at worst to increase tolerance for prolonged sitting/standing  2. Pt. to demonstrate proper cervical and scapula retraction requiring min. to no verbal cues from PT  3. Increase thoracic joint mobility to 2+/6 to promote greater ease with self care skills  4. Increase lumbar joint mobility to 2+/6 to promote greater " ease with prolonged sitting/standing  5. Pt. to demonstrate increased MMT for core/lumbar paraspinals to 3/5 to increase endurance with prolonged sitting/standing.  6. Pt to tolerate HEP to improve ROM and independence with ADL's  7. Pt. to be independent in SIJ correction requiring no verbal cues for demonstration    Long Term Goals: 8 weeks  1. Report decreased lumbar pain </=  2/10 at worst to increase tolerance for work related activities  2. Pt. to demonstrate proper cervical and scapula retraction requiring no verbal cues from PT  3. Increase thoracic joint mobility to 3/6 to promote greater ease with self care skills  4. Increase lumbar joint mobility to 3/6 to promote greater ease with prolonged sitting/standing  5. Pt. to demonstrate increased MMT for core/lumbar paraspinals to 3+/5 to increase endurance with prolonged sitting.   6. Pt. to demonstrate increased MMT for bilateral gluteus medius to 5/5  to increase stability during ambulation on uneven surfaces.  7. Pt. to demonstrate increased MMT for bilateral hip flexor to 5/5 to increase tolerance for ADL and work activities.   8. Pt to be independent with HEP to improve ROM and independence with ADL's  9. Pt. to be independent in symptom management  Assessment   This is a 22 y.o. male referred to outpatient physical therapy who presents with a medical diagnosis of chronic right sided LBP and PT diagnosis of weakness, SIJ dysfunction demonstrating joint dysfunction and functional limitation as described above. Level of complexity is moderate;  based on patient's past medical history including the above co-morbidities and personal factors; functional limitations, and clinical presentation directly impacting his/her plan of care. Pt demonstrates good rehab potential.    Patient presents with signs and symptoms consistent with lumbar spine mechanical dysfunction and SIJ dysfunction.Patient was in agreement with set goals and plan of care. . Pt will benefit  from physical therapy services in order to maximize pain free functional independence.     Plan     Pt will be treated by physical therapy 1-3 times a week for 8 weeks for pt. education, HEP, aquatic therapy if land based regimen is not tolerable, therapeutic exercises, neuromuscular re-education, soft tissue and joint mobilizations; and modalities, including but not exclusive to dry needling, prn to achieve established goals. Patel may at times be seen by a PTA as part of the Rehab Team.     I certify the need for these services furnished under this plan of treatment and while under my care.______________________________ Physician/Referring Practitioner  Date of Signature

## 2018-06-08 ENCOUNTER — CLINICAL SUPPORT (OUTPATIENT)
Dept: REHABILITATION | Facility: HOSPITAL | Age: 23
End: 2018-06-08
Attending: INTERNAL MEDICINE
Payer: MEDICAID

## 2018-06-08 DIAGNOSIS — M53.3 SACROILIAC DYSFUNCTION: ICD-10-CM

## 2018-06-08 DIAGNOSIS — R53.1 WEAKNESS: ICD-10-CM

## 2018-06-08 PROCEDURE — 97110 THERAPEUTIC EXERCISES: CPT | Mod: PO

## 2018-06-08 PROCEDURE — 97140 MANUAL THERAPY 1/> REGIONS: CPT | Mod: PO

## 2018-06-08 NOTE — PROGRESS NOTES
"                                                    Physical Therapy Progress Note     Name: Patel Owusu [facility] Number: [id number]  Diagnosis:   Encounter Diagnoses   Name Primary?    Weakness     Sacroiliac dysfunction      Physician: Torin Crane, *  Treatment Orders: PT Eval and Treat  Past Medical History:   Diagnosis Date    Dental infection        Precautions: standard    Evaluation Date: 6/1/18  Visit # authorized: 2/6  Authorization period: 6/30/18  Plan of care expiration: 8/1/18  Referring Provider: Torin Crane, *    Subjective     Pt reports: he may have stretched wrong so his right low back is bothering him right now.     Pain Scale: before treatment: 3 currently; after treatment: 2    Objective     ROM: before treatment: ; after treatment: NT    Patient received individual therapy to increase strength, endurance, ROM, flexibility, posture and core stabilization with 1 patients with activities as follows:     Therapeutic exercise: Patel received therapeutic exercises to develop strength, endurance, ROM, flexibility, posture and core stabilization for 25 minutes including:     Supine:   · bilateral piriformis stretch: 3x30 sec.  · MET right ilial anterior/left posterior ilial rotation: 3x5"  · sciatic nerve glide: 3x10 (10 rep increments)  · TA brace with bridge with isometric hip abduction with GTB: 2x8  · TA brace with isometric hip adduction using small ball: 2x8  · TA brace with knee fall out: 2x8  Sidelying:   · s/l clams: 2x8 bilaterally  Sitting:  Standing:    Manual therapy: Patel received the following manual therapy techniques: Joint mobilizations and Soft tissue Mobilization were applied to: lumbar spine for 30 minutes.    Manual techniques include:  Soft tissue mobilization:  · prone:    · lamina release bilateral Tspine to lumbar spine  · QL myofascial technique  Joint mobilization:  · p/a Lspine: right lumbar spine  · left sidebending over two towel rolls " "with SP lift up  · left sidebending with legs off of table    Written Home Exercises Provided: review of current exercise regimen; View at "[email or web address]" using code: [email or web address]  Pt demo good understanding of the education provided. Patel demonstrated good return demonstration of activities.     Pt. education:  · Posture reeducation, body mechanics, HEP,activity modification/avoidance  · No spiritual or educational barriers to learning provided  · Pt has no cultural, educational or language barriers to learning provided.    Assessment     Pt. had good tolerance to manual therapy noting less stiffness and improved mobility afterwards. Pt. was reeducated on posture, side effects of recurrent self manipulation, and pain origin. Pt. verbalized understanding and reports compliance with HEP. Pt. has improved AROM after each manual intervention. Pt. is progressing well towards goals. Pt will continue to benefit from skilled outpatient physical therapy to address the remaining functional deficits, provide pt/family education, and to maximize pt's level of independence in the home and community environment. .     Anticipated barriers to physical therapy: chronicity of condition    · Pt's spiritual, cultural and educational needs considered and pt agreeable to plan of care and goals as stated below:   Medical necessity is demonstrated by the following IMPAIRMENTS/PROBLEM LIST:              1) Pain limiting function              2) Posture dysfunction              3) Core/Lumbar/LE weakness              4) Decreased thoracic/lumbar joint mobility              5) Decreased Lumbar ROM              6) Decreased soft tissue extensibility/fascia restriction              7) Decreased LE flexibility: hamstrings and piriformis left greater than right              8) Lack of HEP              9) LE paresthesia intermittently right              10) Sacroiliac dysfunction     GOALS:   Short Term Goals:  4 weeks  1. Report decreased " lumbar pain </= 5/10 at worst to increase tolerance for prolonged sitting/standing  2. Pt. to demonstrate proper cervical and scapula retraction requiring min. to no verbal cues from PT  3. Increase thoracic joint mobility to 2+/6 to promote greater ease with self care skills  4. Increase lumbar joint mobility to 2+/6 to promote greater ease with prolonged sitting/standing  5. Pt. to demonstrate increased MMT for core/lumbar paraspinals to 3/5 to increase endurance with prolonged sitting/standing.  6. Pt to tolerate HEP to improve ROM and independence with ADL's  7. Pt. to be independent in SIJ correction requiring no verbal cues for demonstration     Long Term Goals: 8 weeks  1. Report decreased lumbar pain </=  2/10 at worst to increase tolerance for work related activities  2. Pt. to demonstrate proper cervical and scapula retraction requiring no verbal cues from PT  3. Increase thoracic joint mobility to 3/6 to promote greater ease with self care skills  4. Increase lumbar joint mobility to 3/6 to promote greater ease with prolonged sitting/standing  5. Pt. to demonstrate increased MMT for core/lumbar paraspinals to 3+/5 to increase endurance with prolonged sitting.   6. Pt. to demonstrate increased MMT for bilateral gluteus medius to 5/5  to increase stability during ambulation on uneven surfaces.  7. Pt. to demonstrate increased MMT for bilateral hip flexor to 5/5 to increase tolerance for ADL and work activities.   8. Pt to be independent with HEP to improve ROM and independence with ADL's  9. Pt. to be independent in symptom management     Plan   Continue with established Plan of Care towards PT goals.

## 2018-06-21 ENCOUNTER — CLINICAL SUPPORT (OUTPATIENT)
Dept: REHABILITATION | Facility: HOSPITAL | Age: 23
End: 2018-06-21
Attending: INTERNAL MEDICINE
Payer: MEDICAID

## 2018-06-21 DIAGNOSIS — R53.1 WEAKNESS: ICD-10-CM

## 2018-06-21 DIAGNOSIS — M53.3 SACROILIAC DYSFUNCTION: ICD-10-CM

## 2018-06-21 PROCEDURE — 97110 THERAPEUTIC EXERCISES: CPT | Mod: PO

## 2018-06-21 NOTE — PROGRESS NOTES
"                                                    Physical Therapy Progress Note     Name: Patel Owusu [facility] Number: [id number]  Diagnosis:   Encounter Diagnoses   Name Primary?    Weakness     Sacroiliac dysfunction      Physician: Torin Crane, *  Treatment Orders: PT Eval and Treat  Past Medical History:   Diagnosis Date    Dental infection        Precautions: standard    Evaluation Date: 6/1/18  Visit # authorized: 3/6  Authorization period: 6/30/18  Plan of care expiration: 8/1/18  Referring Provider: Torin Crane, *   Time In: 4:00  Time out: 4:40  Treatment time: 40    Subjective     Pt reports: some chronic lower back pain. State that it hurts if he moves the wrong way.     Pain Scale: before treatment: 3 currently; after treatment: 2    Objective     ROM: before treatment: ; after treatment: NT      Therapeutic exercise: Patel received therapeutic exercises to develop strength, endurance, ROM, flexibility, posture and core stabilization for 25 minutes including:     Supine:   · bilateral piriformis stretch: 3x30 sec.  · MET right ilial anterior/left posterior ilial rotation: 3x5"  · sciatic nerve glide: 3x10 (10 rep increments)  · TA brace with bridge with isometric hip abduction with GTB: 2x10  · TA brace with isometric hip adduction using small ball: 2x10  · TA brace with knee fall out: 2x10  Sidelying:   · s/l clams: 2x10 bilaterally  Sitting:  Standing:    Manual therapy: Patel received the following manual therapy techniques: Joint mobilizations and Soft tissue Mobilization were applied to: lumbar spine for 10 minutes.    Manual techniques include:  Soft tissue mobilization:  · prone:    · l Soft tissue massage to lumbar paraspinals, Cup massage  Joint mobilization: (NP)   · p/a Lspine: right lumbar spine  · left sidebending over two towel rolls with SP lift up  · left sidebending with legs off of table    Written Home Exercises Provided: None new added . He was " educated to continue with previous.  Pt demo good understanding of the education provided. Patel demonstrated good return demonstration of activities.     Pt. education:  · Posture reeducation, body mechanics, HEP,activity modification/avoidance  · No spiritual or educational barriers to learning provided  · Pt has no cultural, educational or language barriers to learning provided.    Assessment   Progressed with increased reps.   Pt. maranda Tx well. He was progressed with increased reps.for ex's which he was able to perform without increased pain symptoms. Notes some relief with manual techniques. He was educated and cued re: the importance of postural awareness and core activation with activities.   Pt. verbalized understanding and reports compliance with HEP.   Pt. is progressing well towards goals. Pt will continue to benefit from skilled outpatient physical therapy to address the remaining functional deficits, provide pt/family education, and to maximize pt's level of independence in the home and community environment. .     Anticipated barriers to physical therapy: chronicity of condition    · Pt's spiritual, cultural and educational needs considered and pt agreeable to plan of care and goals as stated below:   Medical necessity is demonstrated by the following IMPAIRMENTS/PROBLEM LIST:              1) Pain limiting function              2) Posture dysfunction              3) Core/Lumbar/LE weakness              4) Decreased thoracic/lumbar joint mobility              5) Decreased Lumbar ROM              6) Decreased soft tissue extensibility/fascia restriction              7) Decreased LE flexibility: hamstrings and piriformis left greater than right              8) Lack of HEP              9) LE paresthesia intermittently right              10) Sacroiliac dysfunction     GOALS:   Short Term Goals:  4 weeks  1. Report decreased lumbar pain </= 5/10 at worst to increase tolerance for prolonged  sitting/standing  2. Pt. to demonstrate proper cervical and scapula retraction requiring min. to no verbal cues from PT  3. Increase thoracic joint mobility to 2+/6 to promote greater ease with self care skills  4. Increase lumbar joint mobility to 2+/6 to promote greater ease with prolonged sitting/standing  5. Pt. to demonstrate increased MMT for core/lumbar paraspinals to 3/5 to increase endurance with prolonged sitting/standing.  6. Pt to tolerate HEP to improve ROM and independence with ADL's  7. Pt. to be independent in SIJ correction requiring no verbal cues for demonstration     Long Term Goals: 8 weeks  1. Report decreased lumbar pain </=  2/10 at worst to increase tolerance for work related activities  2. Pt. to demonstrate proper cervical and scapula retraction requiring no verbal cues from PT  3. Increase thoracic joint mobility to 3/6 to promote greater ease with self care skills  4. Increase lumbar joint mobility to 3/6 to promote greater ease with prolonged sitting/standing  5. Pt. to demonstrate increased MMT for core/lumbar paraspinals to 3+/5 to increase endurance with prolonged sitting.   6. Pt. to demonstrate increased MMT for bilateral gluteus medius to 5/5  to increase stability during ambulation on uneven surfaces.  7. Pt. to demonstrate increased MMT for bilateral hip flexor to 5/5 to increase tolerance for ADL and work activities.   8. Pt to be independent with HEP to improve ROM and independence with ADL's  9. Pt. to be independent in symptom management     Plan   Continue with established Plan of Care towards PT goals.

## 2018-06-22 ENCOUNTER — CLINICAL SUPPORT (OUTPATIENT)
Dept: REHABILITATION | Facility: HOSPITAL | Age: 23
End: 2018-06-22
Attending: INTERNAL MEDICINE
Payer: MEDICAID

## 2018-06-22 DIAGNOSIS — R53.1 WEAKNESS: ICD-10-CM

## 2018-06-22 DIAGNOSIS — M53.3 SACROILIAC DYSFUNCTION: ICD-10-CM

## 2018-06-22 PROCEDURE — 97110 THERAPEUTIC EXERCISES: CPT | Mod: PO

## 2018-06-29 ENCOUNTER — CLINICAL SUPPORT (OUTPATIENT)
Dept: REHABILITATION | Facility: HOSPITAL | Age: 23
End: 2018-06-29
Attending: INTERNAL MEDICINE
Payer: MEDICAID

## 2018-06-29 DIAGNOSIS — R53.1 WEAKNESS: ICD-10-CM

## 2018-06-29 DIAGNOSIS — M53.3 SACROILIAC DYSFUNCTION: ICD-10-CM

## 2018-06-29 PROCEDURE — 97140 MANUAL THERAPY 1/> REGIONS: CPT | Mod: PO

## 2018-06-29 PROCEDURE — 97110 THERAPEUTIC EXERCISES: CPT | Mod: PO

## 2018-06-29 NOTE — PROGRESS NOTES
Physical Therapy Reassessment     Name: Patel Owusu [facility] Number: [id number]  Diagnosis:   Encounter Diagnoses   Name Primary?    Weakness     Sacroiliac dysfunction      Physician: Torin Crane, *  Treatment Orders: PT Eval and Treat  Past Medical History:   Diagnosis Date    Dental infection        Precautions: standard    Evaluation Date: 6/1/18  Visit # authorized: 5/6  Authorization period: 6/30/18  Plan of care expiration: 8/1/18  Referring Provider: Torin Crane, *   Time In: 3  Time out: 4:15  Treatment time: 40    Subjective     Pt reports: much less pain overall in his lower back; only stiffness. Pt. reports intermittent compliance with exercise regimen.  Pain Scale: before treatment: 2 currently; after treatment: 1    Objective     Lumbar ROM: (measured in degrees)     Degrees Quality   Flexion WFL       Extension WFL       Left Side Bending 75% central LBP   Right Side Bending WFL     Left rotation 50%    Right Rotation 75%     Dermatomes: (impaired/normal)      RLE LLE   L2 Intact Intact   L3 Intact Intact   L4 Intact Intact   L5 Intact Intact   S1 Intact Intact      Reflexes: L3/S1: 2 bilaterally     Lower Extremity Strength (graded 0-5 out of 5)    RLE LLE   Hip flexion: 4+/5 4+/5   Hip ER 4+/5 4+/5   Hip IR 5-/5 5-/5   Knee extension: 5-/5 5-/5   Ankle dorsiflexion: 5/5 5/5   Great toe extension: 4-/5 4-/5   Posterior fibers of Gluteus medius 5-/5 5-/5   Knee flexion 5/5 5/5   Ankle plantarflexion 5/5 5/5   Hip extension: 4+/5 4+/5      Special Tests: ((+): pos.; (-): neg.)   · Quadrant test: -  · SLR Test: bilateral 60 deg.   · Bridge Test: + left  · Pirformis Test: -; mod. tightness left hip, mild left     Flexibility:   · Popliteal Angle: R = 55 degrees ; L = 55 degrees     Therapeutic exercise: Patel received therapeutic exercises to develop strength, endurance, ROM, flexibility, posture and core stabilization  "for 40 minutes including: reassessment and the following exercises    Supine:   · bilateral piriformis stretch: 3x30 sec.  · MET right ilial anterior/left posterior ilial rotation: 3x5"  · sciatic nerve glide: 3x10 (10 rep increments)  · TA brace with bridge with isometric hip abduction with GTB: 3x8  · TA brace with isometric hip adduction using small ball with LTR: 3x8  Sidelying:   · s/l clams: 2x10 bilaterally  Prone:  · SOWMYA serratus push up: 2x8  Standing:    Manual therapy: Patel received the following manual therapy techniques: Joint mobilizations and Soft tissue Mobilization were applied to: lumbar spine for 15 minutes.    Manual techniques include:  Soft tissue mobilization:  ·   Joint mobilization:   · p/a Lspine and Tspine    Written Home Exercises Provided: View at "[email or web address]" using code: [email or web address]   Pt demo good understanding of the education provided. Patel demonstrated good return demonstration of activities.     Pt. education:  · Posture reeducation, body mechanics, HEP,activity modification/avoidance  · No spiritual or educational barriers to learning provided  · Pt has no cultural, educational or language barriers to learning provided.    Assessment   Pt. had some improvement in lumbar in lumbar AROM with much less pain and improved LE strength since SOC. Pt. has some mild decrease in left sidebending/rotation due to his sleep position and occupational body mechanics. Pt. advised to comply with exercise regimen and pelvis correction to increased carry-over. Pt. verbalized understanding. Pt. is progressing well towards goals. Pt will continue to benefit from skilled outpatient physical therapy to address the remaining functional deficits, provide pt/family education, and to maximize pt's level of independence in the home and community environment. .     Anticipated barriers to physical therapy: chronicity of condition    · Pt's spiritual, cultural and educational needs considered and " pt agreeable to plan of care and goals as stated below:   Medical necessity is demonstrated by the following IMPAIRMENTS/PROBLEM LIST:              1) Pain limiting function              2) Posture dysfunction              3) Core/Lumbar/LE weakness              4) Decreased thoracic/lumbar joint mobility              5) Decreased Lumbar ROM              6) Decreased soft tissue extensibility/fascia restriction              7) Decreased LE flexibility: hamstrings and piriformis left greater than right              8) Lack of HEP              9) LE paresthesia intermittently right              10) Sacroiliac dysfunction     GOALS:   Short Term Goals:  4 weeks  1. Report decreased lumbar pain </= 5/10 at worst to increase tolerance for prolonged sitting/standing  2. Pt. to demonstrate proper cervical and scapula retraction requiring min. to no verbal cues from PT  3. Increase thoracic joint mobility to 2+/6 to promote greater ease with self care skills  4. Increase lumbar joint mobility to 2+/6 to promote greater ease with prolonged sitting/standing  5. Pt. to demonstrate increased MMT for core/lumbar paraspinals to 3/5 to increase endurance with prolonged sitting/standing.  6. Pt to tolerate HEP to improve ROM and independence with ADL's  7. Pt. to be independent in SIJ correction requiring no verbal cues for demonstration     Long Term Goals: 8 weeks  1. Report decreased lumbar pain </=  2/10 at worst to increase tolerance for work related activities  2. Pt. to demonstrate proper cervical and scapula retraction requiring no verbal cues from PT  3. Increase thoracic joint mobility to 3/6 to promote greater ease with self care skills  4. Increase lumbar joint mobility to 3/6 to promote greater ease with prolonged sitting/standing  5. Pt. to demonstrate increased MMT for core/lumbar paraspinals to 3+/5 to increase endurance with prolonged sitting.   6. Pt. to demonstrate increased MMT for bilateral gluteus medius to  5/5  to increase stability during ambulation on uneven surfaces.  7. Pt. to demonstrate increased MMT for bilateral hip flexor to 5/5 to increase tolerance for ADL and work activities.   8. Pt to be independent with HEP to improve ROM and independence with ADL's  9. Pt. to be independent in symptom management     Plan   Continue with established Plan of Care towards PT goals.

## 2018-07-05 ENCOUNTER — CLINICAL SUPPORT (OUTPATIENT)
Dept: REHABILITATION | Facility: HOSPITAL | Age: 23
End: 2018-07-05
Attending: INTERNAL MEDICINE
Payer: MEDICAID

## 2018-07-05 DIAGNOSIS — R53.1 WEAKNESS: ICD-10-CM

## 2018-07-05 DIAGNOSIS — M53.3 SACROILIAC DYSFUNCTION: ICD-10-CM

## 2018-07-05 PROCEDURE — 97110 THERAPEUTIC EXERCISES: CPT | Mod: PO

## 2018-07-05 NOTE — PROGRESS NOTES
"                                                    Physical Therapy Reassessment     Name: Patel Owusu [facility] Number: [id number]  Diagnosis:   Encounter Diagnoses   Name Primary?    Weakness     Sacroiliac dysfunction      Physician: Torin Crane, *  Treatment Orders: PT Eval and Treat  Past Medical History:   Diagnosis Date    Dental infection        Precautions: standard    Evaluation Date: 6/1/18  Visit # authorized: 6/6  Authorization period: 6/30/18  Plan of care expiration: 8/1/18  Referring Provider: Torin Crane, *   Time In: 4:00  Time out: 4:45  Treatment time: 45    Subjective     Pt reports: much less pain overall in his lower back; only stiffness but improved since the initiation of Tx's. Reports some fatigue due to working as a  yesterday. . Pt. reports intermittent compliance with exercise regimen.  Pain Scale: before treatment: 2 currently; after treatment: 0    Objective      Therapeutic exercise: Patel received therapeutic exercises to develop strength, endurance, ROM, flexibility, posture and core stabilization for 40 minutes including:      Supine:   · bilateral piriformis stretch: 3x30 sec.  · MET right ilial anterior/left posterior ilial rotation: 3x5"  · sciatic nerve glide: 3x10 (10 rep increments)  · TA brace with bridge with isometric hip abduction with GTB: 3x10  · TA brace with isometric hip adduction using small ball with LTR: 3x10  · TA brace with Tband pulldown (GTB) 2 x 8  Sidelying:   · s/l clams: with 1# 2x10 bilaterally  Prone:  · SOWMYA serratus push up: 2x10  ·    Standing:  · Paloff core press with GTB 2 x 10  Manual therapy: Patel received the following manual therapy techniques: Joint mobilizations and Soft tissue Mobilization were applied to: lumbar spine for 5 minutes.    Manual techniques include:  Soft tissue mobilization:  § l Soft tissue massage to lumbar paraspinals, Cup massage  Joint mobilization:  (NP)  · p/a Lspine and " Julio    Written Home Exercises Provided: None new. He was educated to continue with previously issued HEP to tolerance.  Pt demo good understanding of the education provided. Patel demonstrated good return demonstration of activities.     Pt. education:  · Posture reeducation, body mechanics, HEP,activity modification/avoidance  · No spiritual or educational barriers to learning provided  · Pt has no cultural, educational or language barriers to learning     Assessment   Added Paloff core press.   Pt. maranda Tx well. He was able to perform and progress slightly with the above ex's/activities within tolerable level of muscular fatigue and without inrceased pain c/o.   Improved overall pain control and activity tolerance. Added Paloff core press requiring min cues for core engagement. No c/o pain post Tx.  Pt. is progressing well towards goals. Pt will continue to benefit from skilled outpatient physical therapy to address the remaining functional deficits, provide pt/family education, and to maximize pt's level of independence in the home and community environment. .     Anticipated barriers to physical therapy: chronicity of condition    · Pt's spiritual, cultural and educational needs considered and pt agreeable to plan of care and goals as stated below:   Medical necessity is demonstrated by the following IMPAIRMENTS/PROBLEM LIST:              1) Pain limiting function              2) Posture dysfunction              3) Core/Lumbar/LE weakness              4) Decreased thoracic/lumbar joint mobility              5) Decreased Lumbar ROM              6) Decreased soft tissue extensibility/fascia restriction              7) Decreased LE flexibility: hamstrings and piriformis left greater than right              8) Lack of HEP              9) LE paresthesia intermittently right              10) Sacroiliac dysfunction     GOALS:   Short Term Goals:  4 weeks  1. Report decreased lumbar pain </= 5/10 at worst to  increase tolerance for prolonged sitting/standing  2. Pt. to demonstrate proper cervical and scapula retraction requiring min. to no verbal cues from PT  3. Increase thoracic joint mobility to 2+/6 to promote greater ease with self care skills  4. Increase lumbar joint mobility to 2+/6 to promote greater ease with prolonged sitting/standing  5. Pt. to demonstrate increased MMT for core/lumbar paraspinals to 3/5 to increase endurance with prolonged sitting/standing.  6. Pt to tolerate HEP to improve ROM and independence with ADL's  7. Pt. to be independent in SIJ correction requiring no verbal cues for demonstration     Long Term Goals: 8 weeks  1. Report decreased lumbar pain </=  2/10 at worst to increase tolerance for work related activities  2. Pt. to demonstrate proper cervical and scapula retraction requiring no verbal cues from PT  3. Increase thoracic joint mobility to 3/6 to promote greater ease with self care skills  4. Increase lumbar joint mobility to 3/6 to promote greater ease with prolonged sitting/standing  5. Pt. to demonstrate increased MMT for core/lumbar paraspinals to 3+/5 to increase endurance with prolonged sitting.   6. Pt. to demonstrate increased MMT for bilateral gluteus medius to 5/5  to increase stability during ambulation on uneven surfaces.  7. Pt. to demonstrate increased MMT for bilateral hip flexor to 5/5 to increase tolerance for ADL and work activities.   8. Pt to be independent with HEP to improve ROM and independence with ADL's  9. Pt. to be independent in symptom management     Plan   Continue with established Plan of Care towards PT goals.

## 2018-07-06 ENCOUNTER — CLINICAL SUPPORT (OUTPATIENT)
Dept: REHABILITATION | Facility: HOSPITAL | Age: 23
End: 2018-07-06
Attending: INTERNAL MEDICINE
Payer: MEDICAID

## 2018-07-06 DIAGNOSIS — R53.1 WEAKNESS: ICD-10-CM

## 2018-07-06 DIAGNOSIS — G43.009 MIGRAINE WITHOUT AURA AND WITHOUT STATUS MIGRAINOSUS, NOT INTRACTABLE: ICD-10-CM

## 2018-07-06 DIAGNOSIS — M53.3 SACROILIAC DYSFUNCTION: ICD-10-CM

## 2018-07-06 PROCEDURE — 97110 THERAPEUTIC EXERCISES: CPT | Mod: PO

## 2018-07-06 NOTE — PROGRESS NOTES
"                                                    Physical Therapy Progress Note     Name: Patel Owusu [facility] Number: [id number]  Diagnosis:   Encounter Diagnoses   Name Primary?    Weakness     Sacroiliac dysfunction      Physician: Torin Crane, *  Treatment Orders: PT Eval and Treat  Past Medical History:   Diagnosis Date    Dental infection        Precautions: standard    Evaluation Date: 6/1/18  Visit # authorized: 7/6  Authorization period: 6/30/18  Plan of care expiration: 8/1/18  Referring Provider: Torin Crane, *   Time In: 3  Time out: 4  Treatment time: 30 billable time    Subjective     Pt reports: increased compliance with HEP with much less low back pain. Pt. has returned to the gym with only muscle soreness not joint pain.   Pain Scale: before treatment: 1 currently; after treatment: 0    Objective      Therapeutic exercise: Patel received therapeutic exercises to develop strength, endurance, ROM, flexibility, posture and core stabilization for 40 minutes including:      Supine:   · bilateral piriformis stretch: 3x30 sec.  · MET right ilial anterior/left posterior ilial rotation: 3x5"  · sciatic nerve glide: 3x10 (10 rep increments)  · TA brace with TB pull down with GTB and bridge with isometric hip abduction with GTB: 3x10  · TA brace with isometric hip adduction using small ball with LTR: 3x10  Sidelying:   · s/l clams: with 1# 2x10 bilaterally  Prone:  · SOWMYA serratus push up with isometric sh. ER with peach TB: 2x10  · Quadruped alternating arms with TA brace: 1x8  · Quadruped alternating legs slides with TA brace: 1x8  · plank of elbows: 2x30 sec.   Standing:  · Paloff core press with GTB 2 x 10  Manual therapy: Patel received the following manual therapy techniques: Joint mobilizations and Soft tissue Mobilization were applied to: lumbar spine for 10 minutes.    Manual techniques include:  Soft tissue mobilization:  Joint mobilization:  · p/a Lspine  · p/a " Julio    Written Home Exercises Provided: None new. He was educated to continue with previously issued HEP to tolerance.  Pt demo good understanding of the education provided. Patel demonstrated good return demonstration of activities.     Pt. education:  · Posture reeducation, body mechanics, HEP,activity modification/avoidance  · No spiritual or educational barriers to learning provided  · Pt has no cultural, educational or language barriers to learning     Assessment   Pt. had good tolerance to manual therapy and exercise progression denying increased pain. Pt. only had mild abdominal soreness due to previous work out and current progression. Fatigued as a result.  Pt. is progressing well towards goals. Pt will continue to benefit from skilled outpatient physical therapy to address the remaining functional deficits, provide pt/family education, and to maximize pt's level of independence in the home and community environment. .     Anticipated barriers to physical therapy: chronicity of condition    · Pt's spiritual, cultural and educational needs considered and pt agreeable to plan of care and goals as stated below:   Medical necessity is demonstrated by the following IMPAIRMENTS/PROBLEM LIST:              1) Pain limiting function              2) Posture dysfunction              3) Core/Lumbar/LE weakness              4) Decreased thoracic/lumbar joint mobility              5) Decreased Lumbar ROM              6) Decreased soft tissue extensibility/fascia restriction              7) Decreased LE flexibility: hamstrings and piriformis left greater than right              8) Lack of HEP              9) LE paresthesia intermittently right              10) Sacroiliac dysfunction     GOALS:   Short Term Goals:  4 weeks  1. Report decreased lumbar pain </= 5/10 at worst to increase tolerance for prolonged sitting/standing  2. Pt. to demonstrate proper cervical and scapula retraction requiring min. to no verbal  cues from PT  3. Increase thoracic joint mobility to 2+/6 to promote greater ease with self care skills  4. Increase lumbar joint mobility to 2+/6 to promote greater ease with prolonged sitting/standing  5. Pt. to demonstrate increased MMT for core/lumbar paraspinals to 3/5 to increase endurance with prolonged sitting/standing.  6. Pt to tolerate HEP to improve ROM and independence with ADL's  7. Pt. to be independent in SIJ correction requiring no verbal cues for demonstration     Long Term Goals: 8 weeks  1. Report decreased lumbar pain </=  2/10 at worst to increase tolerance for work related activities  2. Pt. to demonstrate proper cervical and scapula retraction requiring no verbal cues from PT  3. Increase thoracic joint mobility to 3/6 to promote greater ease with self care skills  4. Increase lumbar joint mobility to 3/6 to promote greater ease with prolonged sitting/standing  5. Pt. to demonstrate increased MMT for core/lumbar paraspinals to 3+/5 to increase endurance with prolonged sitting.   6. Pt. to demonstrate increased MMT for bilateral gluteus medius to 5/5  to increase stability during ambulation on uneven surfaces.  7. Pt. to demonstrate increased MMT for bilateral hip flexor to 5/5 to increase tolerance for ADL and work activities.   8. Pt to be independent with HEP to improve ROM and independence with ADL's  9. Pt. to be independent in symptom management     Plan   Continue with established Plan of Care towards PT goals.

## 2018-07-13 ENCOUNTER — CLINICAL SUPPORT (OUTPATIENT)
Dept: REHABILITATION | Facility: HOSPITAL | Age: 23
End: 2018-07-13
Attending: INTERNAL MEDICINE
Payer: MEDICAID

## 2018-07-13 DIAGNOSIS — G43.009 MIGRAINE WITHOUT AURA AND WITHOUT STATUS MIGRAINOSUS, NOT INTRACTABLE: ICD-10-CM

## 2018-07-13 DIAGNOSIS — M53.3 SACROILIAC DYSFUNCTION: ICD-10-CM

## 2018-07-13 DIAGNOSIS — R53.1 WEAKNESS: ICD-10-CM

## 2018-07-13 PROCEDURE — 97110 THERAPEUTIC EXERCISES: CPT | Mod: PO

## 2018-07-13 NOTE — PROGRESS NOTES
"                                                    Physical Therapy Progress Note     Name: Patel Owusu [facility] Number: [id number]  Diagnosis:   Encounter Diagnoses   Name Primary?    Weakness     Sacroiliac dysfunction      Physician: Torin Crane, *  Treatment Orders: PT Eval and Treat  Past Medical History:   Diagnosis Date    Dental infection        Precautions: standard    Evaluation Date: 6/1/18  Visit # authorized: 8/10  Authorization period: 6/30/18  Plan of care expiration: 8/1/18  Referring Provider: Torin Crane, *   Time In: 3:20  Time out: 4:10  Treatment time: 40 min. billable    Subjective     Pt reports: he biked back from [place] after work and that is why he is late for his appointment; mild thoracic pain as a result. Notes that the exercises are helpful.   Pain Scale: before treatment: 1 currently; after treatment: 0    Objective      Therapeutic exercise: Patel received therapeutic exercises to develop strength, endurance, ROM, flexibility, posture and core stabilization for 40 minutes including:      Supine:   · bilateral piriformis stretch: 3x30 sec.  · MET right ilial anterior/left posterior ilial rotation: 3x5"  · sciatic nerve glide: 3x10 (10 rep increments)  · TA brace with TB pull down with GTB and bridge with isometric hip abduction with GTB: 3x10  · Dead bug with isometric into SB 2x8  Sidelying:   · side plank at knees: 2x20 sec.  bilaterally  Prone:  · SOWMYA plank press up: 1x8  · prayer stretch all directions: 1x30 sec.  · Quadruped alternating arms with TA brace: 2x8  · Quadruped alternating legs slides with TA brace: 2x8  Standing:  · Paloff core press with GTB 3x8 NP due to time  Manual therapy: Patel received the following manual therapy techniques: Joint mobilizations and Soft tissue Mobilization were applied to: lumbar spine for 15 minutes.    Manual techniques include:  Soft tissue mobilization:  Joint mobilization:  · left sidelying over " two towel rolls with legs off of table  · p/a Tspine through Lspine    Written Home Exercises Provided: None new. He was educated to continue with previously issued HEP to tolerance.  Pt demo good understanding of the education provided. Patel demonstrated good return demonstration of activities.     Pt. education:  · Posture reeducation, body mechanics, HEP,activity modification/avoidance  · No spiritual or educational barriers to learning provided  · Pt has no cultural, educational or language barriers to learning     Assessment   Pt. continues to report improved AROM and joint mobility with manual therapy and self pelvis correction. Pt. demonstrates significant fatigue with any core training. Pt. was given an updated HEP to improve carry-over as pt. will continue 1x/wk for 2 to 3 weeks prior to discharge. Pt. is progressing well towards goals.     Pt will continue to benefit from skilled outpatient physical therapy to address the remaining functional deficits, provide pt/family education, and to maximize pt's level of independence in the home and community environment.     Anticipated barriers to physical therapy: chronicity of condition    · Pt's spiritual, cultural and educational needs considered and pt agreeable to plan of care and goals as stated below:   Medical necessity is demonstrated by the following IMPAIRMENTS/PROBLEM LIST:              1) Pain limiting function              2) Posture dysfunction              3) Core/Lumbar/LE weakness              4) Decreased thoracic/lumbar joint mobility              5) Decreased Lumbar ROM              6) Decreased soft tissue extensibility/fascia restriction              7) Decreased LE flexibility: hamstrings and piriformis left greater than right              8) Lack of HEP              9) LE paresthesia intermittently right              10) Sacroiliac dysfunction     GOALS:   Short Term Goals:  4 weeks  1. Report decreased lumbar pain </= 5/10 at worst to  increase tolerance for prolonged sitting/standing MET  2. Pt. to demonstrate proper cervical and scapula retraction requiring min. to no verbal cues from PT MET  3. Increase thoracic joint mobility to 2+/6 to promote greater ease with self care skills MET  4. Increase lumbar joint mobility to 2+/6 to promote greater ease with prolonged sitting/standing MET  5. Pt. to demonstrate increased MMT for core/lumbar paraspinals to 3/5 to increase endurance with prolonged sitting/standing.  6. Pt to tolerate HEP to improve ROM and independence with ADL's MET  7. Pt. to be independent in SIJ correction requiring no verbal cues for demonstration MET     Long Term Goals: 8 weeks  1. Report decreased lumbar pain </=  2/10 at worst to increase tolerance for work related activities MET  2. Pt. to demonstrate proper cervical and scapula retraction requiring no verbal cues from PT  3. Increase thoracic joint mobility to 3/6 to promote greater ease with self care skills  4. Increase lumbar joint mobility to 3/6 to promote greater ease with prolonged sitting/standing  5. Pt. to demonstrate increased MMT for core/lumbar paraspinals to 3+/5 to increase endurance with prolonged sitting.   6. Pt. to demonstrate increased MMT for bilateral gluteus medius to 5/5  to increase stability during ambulation on uneven surfaces.  7. Pt. to demonstrate increased MMT for bilateral hip flexor to 5/5 to increase tolerance for ADL and work activities.   8. Pt to be independent with HEP to improve ROM and independence with ADL's  9. Pt. to be independent in symptom management     Plan   Continue with established Plan of Care towards PT goals.

## 2018-07-20 ENCOUNTER — CLINICAL SUPPORT (OUTPATIENT)
Dept: REHABILITATION | Facility: HOSPITAL | Age: 23
End: 2018-07-20
Attending: INTERNAL MEDICINE
Payer: MEDICAID

## 2018-07-20 DIAGNOSIS — M53.3 SACROILIAC DYSFUNCTION: ICD-10-CM

## 2018-07-20 DIAGNOSIS — R53.1 WEAKNESS: ICD-10-CM

## 2018-07-20 PROCEDURE — 97110 THERAPEUTIC EXERCISES: CPT | Mod: PO

## 2018-07-20 NOTE — PROGRESS NOTES
"                                                    Physical Therapy Progress Note     Name: Patel Owusu [facility] Number: [id number]  Diagnosis:   Encounter Diagnoses   Name Primary?    Weakness     Sacroiliac dysfunction      Physician: Torin Crane, *  Treatment Orders: PT Eval and Treat  Past Medical History:   Diagnosis Date    Dental infection        Precautions: standard    Evaluation Date: 6/1/18  Visit # authorized: 9/10  Authorization period: 6/30/18  Plan of care expiration: 8/1/18  Referring Provider: Torin Crane, *   Time In: 11  Time out: 12:10  Treatment time: 40 min. billable    Subjective     Pt reports: he is doing his exercises more often/consistently; minimal to no back pain: 1/10. Notes regular progress  Pain Scale: before treatment: 1 currently; after treatment: 0    Objective      Therapeutic exercise: Patel received therapeutic exercises to develop strength, endurance, ROM, flexibility, posture and core stabilization for 40 minutes including:      Supine:   · bilateral piriformis stretch: 3x45 sec.  · MET right ilial anterior/left posterior ilial rotation: 3x5"  · sciatic nerve glide: 3x10 (10 rep increments)  · TA brace with TB pull down with GTB and bridge with isometric hip abduction with GTB: 3x10  · Dead bug with isometric into SB 2x9  Sidelying:   · side plank at knees: 2x20 sec.  bilaterally  Prone:  · SOWMYA plank press up: 1x10  · prayer stretch all directions: 1x30 sec.  · Quadruped alternating arms with TA brace: 2x8  · Quadruped alternating legs slides with TA brace: 2x8  Standing:  · Paloff core press with GTB 3x8 NP due to time  Manual therapy: Patel received the following manual therapy techniques: Joint mobilizations and Soft tissue Mobilization were applied to: lumbar spine for 15 minutes.    Manual techniques include:  Soft tissue mobilization:  · Pt received tool-assisted massage to QL, lumbar paraspinals x 5 minutes to trigger an inflammatory " healing response and stimulate the production of new collagen and proper, more functional, less painful healing.  · lamina release  Joint mobilization:  · p/a lumbar     Written Home Exercises Provided: None new. He was educated to continue with previously issued HEP to tolerance.  Pt demo good understanding of the education provided. Patel demonstrated good return demonstration of activities.     Pt. education:  · Posture reeducation, body mechanics, HEP,activity modification/avoidance  · No spiritual or educational barriers to learning provided  · Pt has no cultural, educational or language barriers to learning     Assessment   Pt. had good tolerance to manual therapy and exercise progression denying increased pain only mild muscle soreness. Improved exercise tolerance/endurance. Pt. is progressing well towards goals.     Pt will continue to benefit from skilled outpatient physical therapy to address the remaining functional deficits, provide pt/family education, and to maximize pt's level of independence in the home and community environment.     Anticipated barriers to physical therapy: chronicity of condition    · Pt's spiritual, cultural and educational needs considered and pt agreeable to plan of care and goals as stated below:   Medical necessity is demonstrated by the following IMPAIRMENTS/PROBLEM LIST:              1) Pain limiting function              2) Posture dysfunction              3) Core/Lumbar/LE weakness              4) Decreased thoracic/lumbar joint mobility              5) Decreased Lumbar ROM              6) Decreased soft tissue extensibility/fascia restriction              7) Decreased LE flexibility: hamstrings and piriformis left greater than right              8) Lack of HEP              9) LE paresthesia intermittently right              10) Sacroiliac dysfunction     GOALS:   Short Term Goals:  4 weeks  1. Report decreased lumbar pain </= 5/10 at worst to increase tolerance for  prolonged sitting/standing MET  2. Pt. to demonstrate proper cervical and scapula retraction requiring min. to no verbal cues from PT MET  3. Increase thoracic joint mobility to 2+/6 to promote greater ease with self care skills MET  4. Increase lumbar joint mobility to 2+/6 to promote greater ease with prolonged sitting/standing MET  5. Pt. to demonstrate increased MMT for core/lumbar paraspinals to 3/5 to increase endurance with prolonged sitting/standing.  6. Pt to tolerate HEP to improve ROM and independence with ADL's MET  7. Pt. to be independent in SIJ correction requiring no verbal cues for demonstration MET     Long Term Goals: 8 weeks  1. Report decreased lumbar pain </=  2/10 at worst to increase tolerance for work related activities MET  2. Pt. to demonstrate proper cervical and scapula retraction requiring no verbal cues from PT  3. Increase thoracic joint mobility to 3/6 to promote greater ease with self care skills  4. Increase lumbar joint mobility to 3/6 to promote greater ease with prolonged sitting/standing  5. Pt. to demonstrate increased MMT for core/lumbar paraspinals to 3+/5 to increase endurance with prolonged sitting.   6. Pt. to demonstrate increased MMT for bilateral gluteus medius to 5/5  to increase stability during ambulation on uneven surfaces.  7. Pt. to demonstrate increased MMT for bilateral hip flexor to 5/5 to increase tolerance for ADL and work activities.   8. Pt to be independent with HEP to improve ROM and independence with ADL's  9. Pt. to be independent in symptom management     Plan   Continue with established Plan of Care towards PT goals.

## 2018-07-27 ENCOUNTER — CLINICAL SUPPORT (OUTPATIENT)
Dept: REHABILITATION | Facility: HOSPITAL | Age: 23
End: 2018-07-27
Attending: INTERNAL MEDICINE
Payer: MEDICAID

## 2018-07-27 DIAGNOSIS — R53.1 WEAKNESS: ICD-10-CM

## 2018-07-27 DIAGNOSIS — M53.3 SACROILIAC DYSFUNCTION: ICD-10-CM

## 2018-07-27 PROCEDURE — 97110 THERAPEUTIC EXERCISES: CPT | Mod: PO

## 2018-07-27 NOTE — PROGRESS NOTES
"                                                    Physical Therapy Progress Note     Name: Patel Owusu [facility] Number: [id number]  Diagnosis:   Encounter Diagnoses   Name Primary?    Weakness     Sacroiliac dysfunction      Physician: Torin Crane, *  Treatment Orders: PT Eval and Treat  Past Medical History:   Diagnosis Date    Dental infection        Precautions: standard    Evaluation Date: 6/1/18  Visit # authorized: 10/10  Authorization period: 6/30/18  Plan of care expiration: 8/1/18  Referring Provider: Torin Crane, *   Time In: 2  Time out: 3  Treatment time: 40 min. billable    Subjective     Pt reports: he is having a bit of mid back pain due to recent events in personal life. Denies low back pain.   Pain Scale: before treatment: 1 currently; after treatment: 0    Objective      Therapeutic exercise: Patel received therapeutic exercises to develop strength, endurance, ROM, flexibility, posture and core stabilization for 40 minutes including:      Supine:   · bilateral piriformis stretch in pretzel position: 3x30 sec.  · MET right ilial anterior/left posterior ilial rotation: 3x5"  · sciatic nerve glide: 3x10 (10 rep increments)  · TA brace with TB pull down with GTB and bridge with isometric hip abduction with GTB: 3x12  · Dead bug with isometric into SB 2x10  Sidelying:   · side plank at knees: 2x20 sec.  bilaterally  Prone:  · SOWMYA plank press up: 3x8  · prayer stretch all directions: 2x30 sec.  · Quadruped alternating arms and legs with TA brace and dowel along back: 2x8  Standing:  · Paloff core press with GTB 3x8 NP due to time  Manual therapy: Patel received the following manual therapy techniques: Joint mobilizations and Soft tissue Mobilization were applied to: lumbar spine for 15 minutes.    Manual techniques include:  Soft tissue mobilization:  Joint mobilization:  · p/a thoracic through lumbar spine     Written Home Exercises Provided: None new. He was " educated to continue with previously issued HEP to tolerance.  Pt demo good understanding of the education provided. Patel demonstrated good return demonstration of activities.     Pt. education:  · Posture reeducation, body mechanics, HEP,activity modification/avoidance  · No spiritual or educational barriers to learning provided  · Pt has no cultural, educational or language barriers to learning     Assessment   Pt. has much improved joint mobility noted after exercises. PT did not have to mobilize greater than one level due to pt. being warmed up and well stretched out. Pt. reports compliance with exercise regimen. Pt. is progressing well towards goals.     Pt will continue to benefit from skilled outpatient physical therapy to address the remaining functional deficits, provide pt/family education, and to maximize pt's level of independence in the home and community environment.     Anticipated barriers to physical therapy: chronicity of condition    · Pt's spiritual, cultural and educational needs considered and pt agreeable to plan of care and goals as stated below:   Medical necessity is demonstrated by the following IMPAIRMENTS/PROBLEM LIST:              1) Pain limiting function              2) Posture dysfunction              3) Core/Lumbar/LE weakness              4) Decreased thoracic/lumbar joint mobility              5) Decreased Lumbar ROM              6) Decreased soft tissue extensibility/fascia restriction              7) Decreased LE flexibility: hamstrings and piriformis left greater than right              8) Lack of HEP              9) LE paresthesia intermittently right              10) Sacroiliac dysfunction     GOALS:   Short Term Goals:  4 weeks  1. Report decreased lumbar pain </= 5/10 at worst to increase tolerance for prolonged sitting/standing MET  2. Pt. to demonstrate proper cervical and scapula retraction requiring min. to no verbal cues from PT MET  3. Increase thoracic joint  mobility to 2+/6 to promote greater ease with self care skills MET  4. Increase lumbar joint mobility to 2+/6 to promote greater ease with prolonged sitting/standing MET  5. Pt. to demonstrate increased MMT for core/lumbar paraspinals to 3/5 to increase endurance with prolonged sitting/standing.  6. Pt to tolerate HEP to improve ROM and independence with ADL's MET  7. Pt. to be independent in SIJ correction requiring no verbal cues for demonstration MET     Long Term Goals: 8 weeks  1. Report decreased lumbar pain </=  2/10 at worst to increase tolerance for work related activities MET  2. Pt. to demonstrate proper cervical and scapula retraction requiring no verbal cues from PT  3. Increase thoracic joint mobility to 3/6 to promote greater ease with self care skills  4. Increase lumbar joint mobility to 3/6 to promote greater ease with prolonged sitting/standing  5. Pt. to demonstrate increased MMT for core/lumbar paraspinals to 3+/5 to increase endurance with prolonged sitting.   6. Pt. to demonstrate increased MMT for bilateral gluteus medius to 5/5  to increase stability during ambulation on uneven surfaces.  7. Pt. to demonstrate increased MMT for bilateral hip flexor to 5/5 to increase tolerance for ADL and work activities.   8. Pt to be independent with HEP to improve ROM and independence with ADL's  9. Pt. to be independent in symptom management     Plan   Continue with established Plan of Care towards PT goals.

## 2018-08-03 ENCOUNTER — CLINICAL SUPPORT (OUTPATIENT)
Dept: REHABILITATION | Facility: HOSPITAL | Age: 23
End: 2018-08-03
Attending: INTERNAL MEDICINE
Payer: MEDICAID

## 2018-08-03 DIAGNOSIS — M53.3 SACROILIAC DYSFUNCTION: ICD-10-CM

## 2018-08-03 DIAGNOSIS — R53.1 WEAKNESS: ICD-10-CM

## 2018-08-03 PROCEDURE — 97110 THERAPEUTIC EXERCISES: CPT | Mod: PO

## 2018-08-03 NOTE — PROGRESS NOTES
Physical Therapy Discharge Summary     Name: Patel Owusu [facility] Number: [id number]  Diagnosis:   Encounter Diagnoses   Name Primary?    Weakness     Sacroiliac dysfunction      Physician: Torin Crane, *  Treatment Orders: PT Eval and Treat  Past Medical History:   Diagnosis Date    Dental infection        Precautions: standard    Evaluation Date: 6/1/18  Visit # authorized: 10/10  Authorization period: 6/30/18  Plan of care expiration: 8/1/18  Referring Provider: Torin Crane, *   Time In: 4  Time out: 5  Treatment time: 40 min. billable    Subjective     Pt reports: he is having a bit of mid back pain due to recent events in personal life. Denies low back pain.   Pain Scale: before treatment: 1 currently; after treatment: 0    Objective    Lumbar ROM: (measured in degrees)     Degrees Quality   Flexion WFL        Extension WFL        Left Side Bending WFL    Right Side Bending WFL     Left rotation WFL     Right Rotation WFL     Dermatomes: (impaired/normal)      RLE LLE   L2 Intact Intact   L3 Intact Intact   L4 Intact Intact   L5 Intact Intact   S1 Intact Intact      Reflexes: L3/S1: 2 bilaterally     Lower Extremity Strength (graded 0-5 out of 5)    RLE LLE   Hip flexion: 5/5 5/5   Hip ER 5-/5 5-/5   Hip IR 5-/5 5-/5   Knee extension: 5-/5 5-/5   Ankle dorsiflexion: 5/5 5/5   Great toe extension: 5/5 5/5   Posterior fibers of Gluteus medius 5-/5 5-/5   Gluteus jacki 4/5 4/5   Knee flexion 5/5 5/5   Ankle plantarflexion 5/5 5/5   Hip extension: 5-/5 5-/5      Special Tests: ((+): pos.; (-): neg.)   · Quadrant test: -  · SLR Test: bilateral 60 deg.   · Bridge Test: + left  · Pirformis Test: -; mod. tightness left hip, mild left     Flexibility:   · Popliteal Angle: R = 55 degrees ; L = 55 degrees    Therapeutic exercise: Patel received therapeutic exercises to develop strength, endurance, ROM, flexibility, posture and core  "stabilization for 40 minutes including:      Supine:   · bilateral piriformis stretch in pretzel position: 3x45 sec.  · MET right ilial anterior/left posterior ilial rotation: 3x5"  · sciatic nerve glide: 3x10 (10 rep increments)  · TA brace with TB pull down with GTB and bridge with isometric hip abduction with GTB: 3x12  · Dead bug with isometric into SB 2x10  Sidelying:   · side plank at knees: 2x20 sec.  bilaterally  Prone:  · SOWMYA plank press up: 3x10  · prayer stretch all directions: 2x30 sec. ea.  · Quadruped alternating arms and legs with TA brace and dowel along back: 2x8  Standing:  · Paloff core press with GTB 3x8 NP due to time  Manual therapy: Patel received the following manual therapy techniques: Joint mobilizations and Soft tissue Mobilization were applied to: lumbar spine for 15 minutes.    Manual techniques include:  Soft tissue mobilization:  Joint mobilization:  · p/a thoracic through lumbar spine     Written Home Exercises Provided: View at [email or web address]" using code: [email or web address]   Pt demo good understanding of the education provided. Patel demonstrated good return demonstration of activities.     Pt. education:  · Posture reeducation, body mechanics, HEP,activity modification/avoidance  · No spiritual or educational barriers to learning provided  · Pt has no cultural, educational or language barriers to learning     Assessment   Pt. has made a lot of progress in therapy since IE. Pt. agreed with plan to be discharged with a HEP.     Anticipated barriers to physical therapy: chronicity of condition    · Pt's spiritual, cultural and educational needs considered and pt agreeable to plan of care and goals as stated below:   Medical necessity is demonstrated by the following IMPAIRMENTS/PROBLEM LIST:              1) Pain limiting function              2) Posture dysfunction              3) Core/Lumbar/LE weakness              4) Decreased thoracic/lumbar joint mobility              5) " Decreased Lumbar ROM              6) Decreased soft tissue extensibility/fascia restriction              7) Decreased LE flexibility: hamstrings and piriformis left greater than right              8) Lack of HEP              9) LE paresthesia intermittently right              10) Sacroiliac dysfunction     GOALS:   Short Term Goals:  4 weeks  1. Report decreased lumbar pain </= 5/10 at worst to increase tolerance for prolonged sitting/standing MET  2. Pt. to demonstrate proper cervical and scapula retraction requiring min. to no verbal cues from PT MET  3. Increase thoracic joint mobility to 2+/6 to promote greater ease with self care skills MET  4. Increase lumbar joint mobility to 2+/6 to promote greater ease with prolonged sitting/standing MET  5. Pt. to demonstrate increased MMT for core/lumbar paraspinals to 3/5 to increase endurance with prolonged sitting/standing. MET  6. Pt to tolerate HEP to improve ROM and independence with ADL's MET  7. Pt. to be independent in SIJ correction requiring no verbal cues for demonstration MET     Long Term Goals: 8 weeks  1. Report decreased lumbar pain </=  2/10 at worst to increase tolerance for work related activities MET  2. Pt. to demonstrate proper cervical and scapula retraction requiring no verbal cues from PT MET  3. Increase thoracic joint mobility to 3/6 to promote greater ease with self care skills MET  4. Increase lumbar joint mobility to 3/6 to promote greater ease with prolonged sitting/standing MET  5. Pt. to demonstrate increased MMT for core/lumbar paraspinals to 3+/5 to increase endurance with prolonged sitting.  MET  6. Pt. to demonstrate increased MMT for bilateral gluteus medius to 5/5  to increase stability during ambulation on uneven surfaces. MET  7. Pt. to demonstrate increased MMT for bilateral hip flexor to 5/5 to increase tolerance for ADL and work activities. MET  8. Pt to be independent with HEP to improve ROM and independence with ADL's MET  9.  Pt. to be independent in symptom management MET     Plan   Continue with established Plan of Care towards PT goals.

## 2019-01-11 ENCOUNTER — OFFICE VISIT (OUTPATIENT)
Dept: INTERNAL MEDICINE | Facility: CLINIC | Age: 24
End: 2019-01-11
Payer: MEDICAID

## 2019-01-11 VITALS
RESPIRATION RATE: 18 BRPM | TEMPERATURE: 99 F | HEART RATE: 82 BPM | DIASTOLIC BLOOD PRESSURE: 78 MMHG | HEIGHT: 68 IN | SYSTOLIC BLOOD PRESSURE: 132 MMHG | WEIGHT: 132.06 LBS | BODY MASS INDEX: 20.01 KG/M2

## 2019-01-11 DIAGNOSIS — F41.9 ANXIETY: ICD-10-CM

## 2019-01-11 DIAGNOSIS — R07.89 CHEST DISCOMFORT: Primary | ICD-10-CM

## 2019-01-11 PROCEDURE — 99213 OFFICE O/P EST LOW 20 MIN: CPT | Mod: PBBFAC,PO,25 | Performed by: INTERNAL MEDICINE

## 2019-01-11 PROCEDURE — 99999 PR PBB SHADOW E&M-EST. PATIENT-LVL III: ICD-10-PCS | Mod: PBBFAC,,, | Performed by: INTERNAL MEDICINE

## 2019-01-11 PROCEDURE — 93005 ELECTROCARDIOGRAM TRACING: CPT | Mod: PBBFAC,PO | Performed by: INTERNAL MEDICINE

## 2019-01-11 PROCEDURE — 93010 EKG 12-LEAD: ICD-10-PCS | Mod: S$PBB,,, | Performed by: INTERNAL MEDICINE

## 2019-01-11 PROCEDURE — 99214 PR OFFICE/OUTPT VISIT, EST, LEVL IV, 30-39 MIN: ICD-10-PCS | Mod: S$PBB,,, | Performed by: INTERNAL MEDICINE

## 2019-01-11 PROCEDURE — 99999 PR PBB SHADOW E&M-EST. PATIENT-LVL III: CPT | Mod: PBBFAC,,, | Performed by: INTERNAL MEDICINE

## 2019-01-11 PROCEDURE — 99214 OFFICE O/P EST MOD 30 MIN: CPT | Mod: S$PBB,,, | Performed by: INTERNAL MEDICINE

## 2019-01-11 PROCEDURE — 93010 ELECTROCARDIOGRAM REPORT: CPT | Mod: S$PBB,,, | Performed by: INTERNAL MEDICINE

## 2019-01-11 RX ORDER — ESCITALOPRAM OXALATE 20 MG/1
TABLET ORAL
Qty: 30 TABLET | Refills: 0 | Status: SHIPPED | OUTPATIENT
Start: 2019-01-11 | End: 2019-02-21 | Stop reason: SDUPTHER

## 2019-01-11 RX ORDER — AZELASTINE 1 MG/ML
SPRAY, METERED NASAL
Refills: 0 | COMMUNITY
Start: 2019-01-01 | End: 2021-05-04

## 2019-01-11 NOTE — PROGRESS NOTES
Subjective:       Patient ID: Patel Ghotra is a 23 y.o. male.    Chief Complaint: Palpitations; Shortness of Breath; and Chest Pain    HPI   Pt with anxiety is here for evaluation of 1 wk of intermittent symptoms of chest discomfort described as a pulling a/w palpitations, SOB, slight diaphoresis. It takes place at rest only and last from a few minutes to a hr. Pt denies any hx of CAD/MI.   Review of Systems   Constitutional: Negative for activity change, appetite change, chills, diaphoresis, fatigue, fever and unexpected weight change.   HENT: Negative for postnasal drip, rhinorrhea, sinus pressure, sneezing, sore throat, trouble swallowing and voice change.    Respiratory: Negative for cough, shortness of breath and wheezing.    Cardiovascular: Positive for chest pain. Negative for palpitations and leg swelling.   Gastrointestinal: Negative for abdominal pain, blood in stool, constipation, diarrhea, nausea and vomiting.   Genitourinary: Negative for dysuria.   Musculoskeletal: Negative for arthralgias and myalgias.   Skin: Negative for rash and wound.   Allergic/Immunologic: Negative for environmental allergies and food allergies.   Hematological: Negative for adenopathy. Does not bruise/bleed easily.   Psychiatric/Behavioral: Negative for self-injury and suicidal ideas. The patient is nervous/anxious.        Objective:      Physical Exam   Constitutional: He is oriented to person, place, and time. He appears well-developed and well-nourished. No distress.   HENT:   Head: Normocephalic and atraumatic.   Eyes: Conjunctivae and EOM are normal. Pupils are equal, round, and reactive to light. Right eye exhibits no discharge. Left eye exhibits no discharge. No scleral icterus.   Neck: Normal range of motion. Neck supple. No JVD present.   Cardiovascular: Normal rate, regular rhythm and normal heart sounds.   No murmur heard.  Pulmonary/Chest: Effort normal and breath sounds normal. No respiratory distress. He  has no wheezes. He has no rales.   Abdominal: Soft. Bowel sounds are normal. There is no tenderness.   Musculoskeletal: He exhibits no edema.   Lymphadenopathy:     He has no cervical adenopathy.   Neurological: He is alert and oriented to person, place, and time.   Skin: Skin is warm and dry. No rash noted. He is not diaphoretic. No pallor.       Assessment:       1. Chest discomfort    2. Anxiety        Plan:    1. Check EKG   2. Rx Lexapro 20 mg daily    3. F/u in 1 month

## 2019-02-21 ENCOUNTER — OFFICE VISIT (OUTPATIENT)
Dept: INTERNAL MEDICINE | Facility: CLINIC | Age: 24
End: 2019-02-21
Payer: MEDICAID

## 2019-02-21 VITALS
HEIGHT: 68 IN | SYSTOLIC BLOOD PRESSURE: 101 MMHG | RESPIRATION RATE: 16 BRPM | DIASTOLIC BLOOD PRESSURE: 60 MMHG | WEIGHT: 147.69 LBS | BODY MASS INDEX: 22.38 KG/M2 | HEART RATE: 67 BPM | TEMPERATURE: 98 F

## 2019-02-21 DIAGNOSIS — F41.9 ANXIETY: Primary | ICD-10-CM

## 2019-02-21 PROCEDURE — 99999 PR PBB SHADOW E&M-EST. PATIENT-LVL III: ICD-10-PCS | Mod: PBBFAC,,, | Performed by: INTERNAL MEDICINE

## 2019-02-21 PROCEDURE — 99999 PR PBB SHADOW E&M-EST. PATIENT-LVL III: CPT | Mod: PBBFAC,,, | Performed by: INTERNAL MEDICINE

## 2019-02-21 PROCEDURE — 99213 OFFICE O/P EST LOW 20 MIN: CPT | Mod: PBBFAC,PO | Performed by: INTERNAL MEDICINE

## 2019-02-21 PROCEDURE — 99213 OFFICE O/P EST LOW 20 MIN: CPT | Mod: S$PBB,,, | Performed by: INTERNAL MEDICINE

## 2019-02-21 PROCEDURE — 99213 PR OFFICE/OUTPT VISIT, EST, LEVL III, 20-29 MIN: ICD-10-PCS | Mod: S$PBB,,, | Performed by: INTERNAL MEDICINE

## 2019-02-21 RX ORDER — ESCITALOPRAM OXALATE 20 MG/1
TABLET ORAL
Qty: 90 TABLET | Refills: 3 | Status: SHIPPED | OUTPATIENT
Start: 2019-02-21 | End: 2020-11-10

## 2019-02-21 NOTE — PROGRESS NOTES
Subjective:       Patient ID: Patel Ghotra is a 23 y.o. male.    Chief Complaint: Follow-up    HPI   Pt here for f/u regarding anxiety. Pt was started on Lexapro which has improved his symptoms. No major SE's from the medication.   Review of Systems   Constitutional: Negative for activity change, appetite change, chills, diaphoresis, fatigue, fever and unexpected weight change.   HENT: Negative for postnasal drip, rhinorrhea, sinus pressure, sneezing, sore throat, trouble swallowing and voice change.    Respiratory: Negative for cough, shortness of breath and wheezing.    Cardiovascular: Negative for chest pain, palpitations and leg swelling.   Gastrointestinal: Negative for abdominal pain, blood in stool, constipation, diarrhea, nausea and vomiting.   Genitourinary: Negative for dysuria.   Musculoskeletal: Negative for arthralgias and myalgias.   Skin: Negative for rash and wound.   Allergic/Immunologic: Negative for environmental allergies and food allergies.   Hematological: Negative for adenopathy. Does not bruise/bleed easily.   Psychiatric/Behavioral: Negative for self-injury and suicidal ideas. The patient is nervous/anxious.        Objective:      Physical Exam   Constitutional: He is oriented to person, place, and time. He appears well-developed and well-nourished. No distress.   HENT:   Head: Normocephalic and atraumatic.   Eyes: Conjunctivae and EOM are normal. Pupils are equal, round, and reactive to light. Right eye exhibits no discharge. Left eye exhibits no discharge. No scleral icterus.   Neck: Normal range of motion. Neck supple. No JVD present.   Cardiovascular: Normal rate, regular rhythm and normal heart sounds.   No murmur heard.  Pulmonary/Chest: Effort normal and breath sounds normal. No respiratory distress. He has no wheezes. He has no rales.   Musculoskeletal: He exhibits no edema.   Lymphadenopathy:     He has no cervical adenopathy.   Neurological: He is alert and oriented to  person, place, and time.   Skin: Skin is warm and dry. No rash noted. He is not diaphoretic. No pallor.   Psychiatric: He has a normal mood and affect. His behavior is normal. Judgment and thought content normal.       Assessment:       1. Anxiety        Plan:    1. Stable, refill Lexapro 20 mg daily

## 2020-10-05 ENCOUNTER — PATIENT MESSAGE (OUTPATIENT)
Dept: ADMINISTRATIVE | Facility: HOSPITAL | Age: 25
End: 2020-10-05

## 2020-11-10 ENCOUNTER — LAB VISIT (OUTPATIENT)
Dept: LAB | Facility: HOSPITAL | Age: 25
End: 2020-11-10
Attending: INTERNAL MEDICINE
Payer: MEDICAID

## 2020-11-10 ENCOUNTER — OFFICE VISIT (OUTPATIENT)
Dept: INTERNAL MEDICINE | Facility: CLINIC | Age: 25
End: 2020-11-10
Payer: MEDICAID

## 2020-11-10 VITALS
RESPIRATION RATE: 18 BRPM | DIASTOLIC BLOOD PRESSURE: 78 MMHG | HEART RATE: 88 BPM | OXYGEN SATURATION: 98 % | TEMPERATURE: 97 F | BODY MASS INDEX: 20.01 KG/M2 | SYSTOLIC BLOOD PRESSURE: 112 MMHG | HEIGHT: 68 IN | WEIGHT: 132.06 LBS

## 2020-11-10 DIAGNOSIS — R00.2 PALPITATIONS: ICD-10-CM

## 2020-11-10 DIAGNOSIS — Z00.00 ENCOUNTER FOR PREVENTIVE HEALTH EXAMINATION: Primary | ICD-10-CM

## 2020-11-10 DIAGNOSIS — F41.9 ANXIETY: ICD-10-CM

## 2020-11-10 DIAGNOSIS — Z00.00 ENCOUNTER FOR PREVENTIVE HEALTH EXAMINATION: ICD-10-CM

## 2020-11-10 LAB
ALBUMIN SERPL BCP-MCNC: 4.7 G/DL (ref 3.5–5.2)
ALP SERPL-CCNC: 72 U/L (ref 55–135)
ALT SERPL W/O P-5'-P-CCNC: 15 U/L (ref 10–44)
ANION GAP SERPL CALC-SCNC: 10 MMOL/L (ref 8–16)
AST SERPL-CCNC: 21 U/L (ref 10–40)
BASOPHILS # BLD AUTO: 0.05 K/UL (ref 0–0.2)
BASOPHILS NFR BLD: 1 % (ref 0–1.9)
BILIRUB SERPL-MCNC: 0.9 MG/DL (ref 0.1–1)
BUN SERPL-MCNC: 10 MG/DL (ref 6–20)
CALCIUM SERPL-MCNC: 9.9 MG/DL (ref 8.7–10.5)
CHLORIDE SERPL-SCNC: 102 MMOL/L (ref 95–110)
CHOLEST SERPL-MCNC: 164 MG/DL (ref 120–199)
CHOLEST/HDLC SERPL: 3.5 {RATIO} (ref 2–5)
CO2 SERPL-SCNC: 27 MMOL/L (ref 23–29)
CREAT SERPL-MCNC: 0.9 MG/DL (ref 0.5–1.4)
DIFFERENTIAL METHOD: NORMAL
EOSINOPHIL # BLD AUTO: 0.1 K/UL (ref 0–0.5)
EOSINOPHIL NFR BLD: 1.8 % (ref 0–8)
ERYTHROCYTE [DISTWIDTH] IN BLOOD BY AUTOMATED COUNT: 11.8 % (ref 11.5–14.5)
EST. GFR  (AFRICAN AMERICAN): >60 ML/MIN/1.73 M^2
EST. GFR  (NON AFRICAN AMERICAN): >60 ML/MIN/1.73 M^2
GLUCOSE SERPL-MCNC: 102 MG/DL (ref 70–110)
HCT VFR BLD AUTO: 45.4 % (ref 40–54)
HDLC SERPL-MCNC: 47 MG/DL (ref 40–75)
HDLC SERPL: 28.7 % (ref 20–50)
HGB BLD-MCNC: 15.2 G/DL (ref 14–18)
IMM GRANULOCYTES # BLD AUTO: 0.02 K/UL (ref 0–0.04)
IMM GRANULOCYTES NFR BLD AUTO: 0.4 % (ref 0–0.5)
LDLC SERPL CALC-MCNC: 107.2 MG/DL (ref 63–159)
LYMPHOCYTES # BLD AUTO: 1.8 K/UL (ref 1–4.8)
LYMPHOCYTES NFR BLD: 36.8 % (ref 18–48)
MCH RBC QN AUTO: 29.9 PG (ref 27–31)
MCHC RBC AUTO-ENTMCNC: 33.5 G/DL (ref 32–36)
MCV RBC AUTO: 89 FL (ref 82–98)
MONOCYTES # BLD AUTO: 0.4 K/UL (ref 0.3–1)
MONOCYTES NFR BLD: 7.3 % (ref 4–15)
NEUTROPHILS # BLD AUTO: 2.6 K/UL (ref 1.8–7.7)
NEUTROPHILS NFR BLD: 52.7 % (ref 38–73)
NONHDLC SERPL-MCNC: 117 MG/DL
NRBC BLD-RTO: 0 /100 WBC
PLATELET # BLD AUTO: 273 K/UL (ref 150–350)
PMV BLD AUTO: 10 FL (ref 9.2–12.9)
POTASSIUM SERPL-SCNC: 4.2 MMOL/L (ref 3.5–5.1)
PROT SERPL-MCNC: 7.7 G/DL (ref 6–8.4)
RBC # BLD AUTO: 5.09 M/UL (ref 4.6–6.2)
SODIUM SERPL-SCNC: 139 MMOL/L (ref 136–145)
TRIGL SERPL-MCNC: 49 MG/DL (ref 30–150)
TSH SERPL DL<=0.005 MIU/L-ACNC: 1.28 UIU/ML (ref 0.4–4)
WBC # BLD AUTO: 4.95 K/UL (ref 3.9–12.7)

## 2020-11-10 PROCEDURE — 99395 PR PREVENTIVE VISIT,EST,18-39: ICD-10-PCS | Mod: S$PBB,,, | Performed by: INTERNAL MEDICINE

## 2020-11-10 PROCEDURE — 84443 ASSAY THYROID STIM HORMONE: CPT

## 2020-11-10 PROCEDURE — 36415 COLL VENOUS BLD VENIPUNCTURE: CPT | Mod: PO

## 2020-11-10 PROCEDURE — 99999 PR PBB SHADOW E&M-EST. PATIENT-LVL III: ICD-10-PCS | Mod: PBBFAC,,, | Performed by: INTERNAL MEDICINE

## 2020-11-10 PROCEDURE — 99999 PR PBB SHADOW E&M-EST. PATIENT-LVL III: CPT | Mod: PBBFAC,,, | Performed by: INTERNAL MEDICINE

## 2020-11-10 PROCEDURE — 99213 OFFICE O/P EST LOW 20 MIN: CPT | Mod: PBBFAC,PO | Performed by: INTERNAL MEDICINE

## 2020-11-10 PROCEDURE — 80061 LIPID PANEL: CPT

## 2020-11-10 PROCEDURE — 85025 COMPLETE CBC W/AUTO DIFF WBC: CPT

## 2020-11-10 PROCEDURE — 80053 COMPREHEN METABOLIC PANEL: CPT

## 2020-11-10 PROCEDURE — 99395 PREV VISIT EST AGE 18-39: CPT | Mod: S$PBB,,, | Performed by: INTERNAL MEDICINE

## 2020-11-10 NOTE — PROGRESS NOTES
History of present illness:  25-year-old male in for health assessment.  The patient was accommodated today having arrived 40 min late for his appointment.    Current medications:  None    Review of systems:  General: no fever, chills, generalized body aches. No unexpected weight loss.  Eyes:  No visual disturbances.  HEENT:  No hoarseness, dysphagia, ear pain.  Respiratory:  No cough, no shortness of breath.  Cardiovascular: no chest pain.  He does describe off and on intermittent palpitations.  Current more in the morning.  Not associated with any other symptomatologies.  cough, exertional limb pain. No edema.  GI: no nausea, vomiting.  No abdominal pain. No change in bowel habits.  No melena, no hematochezia.  : no dysuria. No change in the color or character of the urine. No urinary frequency.  Musculoskeletal: no joint pain or swelling.  Neurologic:  No focal neurological complaints.  No headaches.  Skin:  No rashes or other concerns.  Psych:  Chronic anxiety has not tolerated maintenance medication.    Past medical history, past surgical history, family medical history social history is are all noted and reviewed in the electronic medical record history sections.    Health screenings:  Tetanus immunization 2015.    Physical examination:  GENERAL:  Alert, appropriately groomed, no acute distress.  VS:  EYES: sclerae white ,nonicteric. PERRL.  HEENT:  Normocephalic. Ear canals and tympanic membranes normal. Mouth and pharynx normal. No thyromegaly. Trachea midline and freely mobile.  LUNGS:  Clear to ascultation and normal to percussion.  CARDIOVASCULAR:  Normal heart sounds.  No significant murmur. Carotids full bilaterally without bruit.  Pedal pulses intact .  No abdominal bruit.  No peripheral extremity edema.  GI: the abdomen is soft, no distension. No masses , tenderness, organomegaly.   : scrotum, testicles and penis normal.   LYMPHATIC:  No axillary, inguinal , cervical adenopathy.  MUSCULOSKELETAL:   Range of motion, stability and strength of the right and left upper and lower extremities normal. No swollen or tender joints  NEUROLOGIC:  DTR's normal. No gross motor or sensory deficits apparent, gait normal.  SKIN:  No rashes.   MS:  Alert, oriented , affect and mood all appropriate      Impression:  Generally healthy 25-year-old male with history of chronic anxiety.  Palpitations which seems to be a chronic issue.    Plan:  Update health maintenance laboratory data to include CBC, chemistry profile, TSH, liver profile and urinalysis.  Twenty-four Holter monitor.

## 2020-11-20 ENCOUNTER — HOSPITAL ENCOUNTER (OUTPATIENT)
Dept: CARDIOLOGY | Facility: HOSPITAL | Age: 25
Discharge: HOME | End: 2020-11-20
Attending: INTERNAL MEDICINE
Payer: MEDICAID

## 2020-11-20 DIAGNOSIS — R00.2 PALPITATIONS: ICD-10-CM

## 2020-11-20 PROCEDURE — 93227 XTRNL ECG REC<48 HR R&I: CPT | Mod: ,,, | Performed by: INTERNAL MEDICINE

## 2020-11-20 PROCEDURE — 93225 XTRNL ECG REC<48 HRS REC: CPT

## 2020-11-20 PROCEDURE — 93227 HOLTER MONITOR - 24 HOUR (CUPID ONLY): ICD-10-PCS | Mod: ,,, | Performed by: INTERNAL MEDICINE

## 2020-11-24 LAB
OHS CV EVENT MONITOR DAY: 0
OHS CV HOLTER LENGTH DECIMAL HOURS: 24
OHS CV HOLTER LENGTH HOURS: 24
OHS CV HOLTER LENGTH MINUTES: 0

## 2021-04-15 ENCOUNTER — PATIENT MESSAGE (OUTPATIENT)
Dept: RESEARCH | Facility: HOSPITAL | Age: 26
End: 2021-04-15

## 2021-04-28 ENCOUNTER — IMMUNIZATION (OUTPATIENT)
Dept: PRIMARY CARE CLINIC | Facility: CLINIC | Age: 26
End: 2021-04-28
Payer: MEDICAID

## 2021-04-28 DIAGNOSIS — Z23 NEED FOR VACCINATION: Primary | ICD-10-CM

## 2021-04-28 PROCEDURE — 91301 PR SARS-COV-2 COVID-19 VACCINE, NO PRSV, 100MCG/0.5ML, IM: ICD-10-PCS | Mod: S$GLB,,, | Performed by: INTERNAL MEDICINE

## 2021-04-28 PROCEDURE — 0011A PR IMMUNIZ ADMIN, SARS-COV-2 COVID-19 VACC, 100MCG/0.5ML, 1ST DOSE: CPT | Mod: CV19,S$GLB,, | Performed by: INTERNAL MEDICINE

## 2021-04-28 PROCEDURE — 0011A PR IMMUNIZ ADMIN, SARS-COV-2 COVID-19 VACC, 100MCG/0.5ML, 1ST DOSE: ICD-10-PCS | Mod: CV19,S$GLB,, | Performed by: INTERNAL MEDICINE

## 2021-04-28 PROCEDURE — 91301 PR SARS-COV-2 COVID-19 VACCINE, NO PRSV, 100MCG/0.5ML, IM: CPT | Mod: S$GLB,,, | Performed by: INTERNAL MEDICINE

## 2021-04-28 RX ADMIN — Medication 0.5 ML: at 09:04

## 2021-05-04 ENCOUNTER — OFFICE VISIT (OUTPATIENT)
Dept: INTERNAL MEDICINE | Facility: CLINIC | Age: 26
End: 2021-05-04
Payer: MEDICAID

## 2021-05-04 VITALS
HEIGHT: 68 IN | OXYGEN SATURATION: 99 % | SYSTOLIC BLOOD PRESSURE: 132 MMHG | BODY MASS INDEX: 19.91 KG/M2 | WEIGHT: 131.38 LBS | DIASTOLIC BLOOD PRESSURE: 70 MMHG | HEART RATE: 77 BPM

## 2021-05-04 DIAGNOSIS — R39.11 URINARY HESITANCY: ICD-10-CM

## 2021-05-04 DIAGNOSIS — R39.9 LOWER URINARY TRACT SYMPTOMS (LUTS): Primary | ICD-10-CM

## 2021-05-04 LAB
BILIRUB UR QL STRIP: NEGATIVE
CLARITY UR REFRACT.AUTO: CLEAR
COLOR UR AUTO: COLORLESS
GLUCOSE UR QL STRIP: NEGATIVE
HGB UR QL STRIP: NEGATIVE
KETONES UR QL STRIP: NEGATIVE
LEUKOCYTE ESTERASE UR QL STRIP: NEGATIVE
NITRITE UR QL STRIP: NEGATIVE
PH UR STRIP: 7 [PH] (ref 5–8)
PROT UR QL STRIP: NEGATIVE
SP GR UR STRIP: 1 (ref 1–1.03)
URN SPEC COLLECT METH UR: ABNORMAL

## 2021-05-04 PROCEDURE — 87591 N.GONORRHOEAE DNA AMP PROB: CPT | Performed by: STUDENT IN AN ORGANIZED HEALTH CARE EDUCATION/TRAINING PROGRAM

## 2021-05-04 PROCEDURE — 99999 PR PBB SHADOW E&M-EST. PATIENT-LVL III: CPT | Mod: PBBFAC,,, | Performed by: STUDENT IN AN ORGANIZED HEALTH CARE EDUCATION/TRAINING PROGRAM

## 2021-05-04 PROCEDURE — 87491 CHLMYD TRACH DNA AMP PROBE: CPT | Performed by: STUDENT IN AN ORGANIZED HEALTH CARE EDUCATION/TRAINING PROGRAM

## 2021-05-04 PROCEDURE — 99999 PR PBB SHADOW E&M-EST. PATIENT-LVL III: ICD-10-PCS | Mod: PBBFAC,,, | Performed by: STUDENT IN AN ORGANIZED HEALTH CARE EDUCATION/TRAINING PROGRAM

## 2021-05-04 PROCEDURE — 99213 OFFICE O/P EST LOW 20 MIN: CPT | Mod: PBBFAC | Performed by: STUDENT IN AN ORGANIZED HEALTH CARE EDUCATION/TRAINING PROGRAM

## 2021-05-04 PROCEDURE — 81003 URINALYSIS AUTO W/O SCOPE: CPT | Performed by: STUDENT IN AN ORGANIZED HEALTH CARE EDUCATION/TRAINING PROGRAM

## 2021-05-05 ENCOUNTER — OFFICE VISIT (OUTPATIENT)
Dept: UROLOGY | Facility: CLINIC | Age: 26
End: 2021-05-05
Attending: UROLOGY
Payer: MEDICAID

## 2021-05-05 VITALS
WEIGHT: 131 LBS | DIASTOLIC BLOOD PRESSURE: 74 MMHG | HEIGHT: 68 IN | HEART RATE: 62 BPM | BODY MASS INDEX: 19.85 KG/M2 | SYSTOLIC BLOOD PRESSURE: 129 MMHG

## 2021-05-05 DIAGNOSIS — R35.0 URINARY FREQUENCY: ICD-10-CM

## 2021-05-05 DIAGNOSIS — R39.11 URINARY HESITANCY: Primary | ICD-10-CM

## 2021-05-05 LAB
C TRACH DNA SPEC QL NAA+PROBE: NOT DETECTED
N GONORRHOEA DNA SPEC QL NAA+PROBE: NOT DETECTED

## 2021-05-05 PROCEDURE — 99204 PR OFFICE/OUTPT VISIT, NEW, LEVL IV, 45-59 MIN: ICD-10-PCS | Mod: S$GLB,,, | Performed by: UROLOGY

## 2021-05-05 PROCEDURE — 99204 OFFICE O/P NEW MOD 45 MIN: CPT | Mod: S$GLB,,, | Performed by: UROLOGY

## 2021-05-05 RX ORDER — TAMSULOSIN HYDROCHLORIDE 0.4 MG/1
0.4 CAPSULE ORAL DAILY
Qty: 30 CAPSULE | Refills: 11 | Status: SHIPPED | OUTPATIENT
Start: 2021-05-05 | End: 2021-10-27

## 2021-05-05 RX ORDER — IBUPROFEN 800 MG/1
800 TABLET ORAL EVERY 8 HOURS PRN
COMMUNITY
Start: 2021-04-15 | End: 2021-06-17 | Stop reason: CLARIF

## 2021-05-26 ENCOUNTER — IMMUNIZATION (OUTPATIENT)
Dept: INTERNAL MEDICINE | Facility: CLINIC | Age: 26
End: 2021-05-26
Payer: MEDICAID

## 2021-05-26 DIAGNOSIS — Z23 NEED FOR VACCINATION: Primary | ICD-10-CM

## 2021-05-26 PROCEDURE — 0012A COVID-19, MRNA, LNP-S, PF, 100 MCG/0.5 ML DOSE VACCINE: CPT | Mod: PBBFAC

## 2021-06-15 ENCOUNTER — PATIENT OUTREACH (OUTPATIENT)
Dept: ADMINISTRATIVE | Facility: OTHER | Age: 26
End: 2021-06-15

## 2021-06-16 ENCOUNTER — OFFICE VISIT (OUTPATIENT)
Dept: UROLOGY | Facility: CLINIC | Age: 26
End: 2021-06-16
Attending: UROLOGY
Payer: MEDICAID

## 2021-06-16 VITALS
BODY MASS INDEX: 19.85 KG/M2 | HEART RATE: 105 BPM | SYSTOLIC BLOOD PRESSURE: 118 MMHG | HEIGHT: 68 IN | WEIGHT: 131 LBS | DIASTOLIC BLOOD PRESSURE: 69 MMHG

## 2021-06-16 DIAGNOSIS — R35.0 URINARY FREQUENCY: ICD-10-CM

## 2021-06-16 DIAGNOSIS — R39.11 URINARY HESITANCY: ICD-10-CM

## 2021-06-16 DIAGNOSIS — R35.0 URINARY FREQUENCY: Primary | ICD-10-CM

## 2021-06-16 DIAGNOSIS — R39.11 URINARY HESITANCY: Primary | ICD-10-CM

## 2021-06-16 LAB
BILIRUB SERPL-MCNC: NORMAL MG/DL
BLOOD URINE, POC: NORMAL
CLARITY, POC UA: CLEAR
COLOR, POC UA: YELLOW
GLUCOSE UR QL STRIP: NORMAL
KETONES UR QL STRIP: NORMAL
LEUKOCYTE ESTERASE URINE, POC: NORMAL
NITRITE, POC UA: NORMAL
PH, POC UA: 7
PROTEIN, POC: NORMAL
SPECIFIC GRAVITY, POC UA: 1000
UROBILINOGEN, POC UA: NORMAL

## 2021-06-16 PROCEDURE — 99214 PR OFFICE/OUTPT VISIT, EST, LEVL IV, 30-39 MIN: ICD-10-PCS | Mod: 25,S$GLB,, | Performed by: UROLOGY

## 2021-06-16 PROCEDURE — 99214 OFFICE O/P EST MOD 30 MIN: CPT | Mod: 25,S$GLB,, | Performed by: UROLOGY

## 2021-06-16 PROCEDURE — 81002 POCT URINE DIPSTICK WITHOUT MICROSCOPE: ICD-10-PCS | Mod: S$GLB,,, | Performed by: UROLOGY

## 2021-06-16 PROCEDURE — 81002 URINALYSIS NONAUTO W/O SCOPE: CPT | Mod: S$GLB,,, | Performed by: UROLOGY

## 2021-06-16 RX ORDER — CIPROFLOXACIN 2 MG/ML
400 INJECTION, SOLUTION INTRAVENOUS
Status: CANCELLED | OUTPATIENT
Start: 2021-06-16

## 2021-06-16 RX ORDER — HYDROCODONE BITARTRATE AND ACETAMINOPHEN 5; 325 MG/1; MG/1
1 TABLET ORAL EVERY 6 HOURS PRN
COMMUNITY
Start: 2021-06-07 | End: 2021-07-21

## 2021-06-17 ENCOUNTER — HOSPITAL ENCOUNTER (OUTPATIENT)
Dept: PREADMISSION TESTING | Facility: OTHER | Age: 26
Discharge: HOME | End: 2021-06-17
Attending: UROLOGY
Payer: MEDICAID

## 2021-06-17 ENCOUNTER — ANESTHESIA EVENT (OUTPATIENT)
Dept: SURGERY | Facility: OTHER | Age: 26
End: 2021-06-17
Payer: MEDICAID

## 2021-06-17 VITALS
OXYGEN SATURATION: 99 % | BODY MASS INDEX: 19.7 KG/M2 | HEIGHT: 68 IN | DIASTOLIC BLOOD PRESSURE: 58 MMHG | WEIGHT: 130 LBS | HEART RATE: 76 BPM | SYSTOLIC BLOOD PRESSURE: 121 MMHG | TEMPERATURE: 99 F

## 2021-06-17 RX ORDER — LIDOCAINE HYDROCHLORIDE 10 MG/ML
0.5 INJECTION, SOLUTION EPIDURAL; INFILTRATION; INTRACAUDAL; PERINEURAL ONCE
Status: CANCELLED | OUTPATIENT
Start: 2021-06-17 | End: 2021-06-17

## 2021-06-17 RX ORDER — ACETAMINOPHEN 500 MG
1000 TABLET ORAL
Status: CANCELLED | OUTPATIENT
Start: 2021-06-17 | End: 2021-06-17

## 2021-06-17 RX ORDER — FAMOTIDINE 20 MG/1
20 TABLET, FILM COATED ORAL
Status: CANCELLED | OUTPATIENT
Start: 2021-06-17 | End: 2021-06-17

## 2021-06-17 RX ORDER — SODIUM CHLORIDE, SODIUM LACTATE, POTASSIUM CHLORIDE, CALCIUM CHLORIDE 600; 310; 30; 20 MG/100ML; MG/100ML; MG/100ML; MG/100ML
INJECTION, SOLUTION INTRAVENOUS CONTINUOUS
Status: CANCELLED | OUTPATIENT
Start: 2021-06-17

## 2021-06-22 ENCOUNTER — TELEPHONE (OUTPATIENT)
Dept: UROLOGY | Facility: CLINIC | Age: 26
End: 2021-06-22

## 2021-06-23 ENCOUNTER — HOSPITAL ENCOUNTER (OUTPATIENT)
Facility: OTHER | Age: 26
Discharge: HOME | End: 2021-06-23
Attending: UROLOGY | Admitting: UROLOGY
Payer: MEDICAID

## 2021-06-23 ENCOUNTER — ANESTHESIA (OUTPATIENT)
Dept: SURGERY | Facility: OTHER | Age: 26
End: 2021-06-23
Payer: MEDICAID

## 2021-06-23 VITALS
SYSTOLIC BLOOD PRESSURE: 132 MMHG | TEMPERATURE: 98 F | WEIGHT: 130 LBS | BODY MASS INDEX: 19.7 KG/M2 | RESPIRATION RATE: 16 BRPM | DIASTOLIC BLOOD PRESSURE: 66 MMHG | HEIGHT: 68 IN | OXYGEN SATURATION: 100 % | HEART RATE: 62 BPM

## 2021-06-23 DIAGNOSIS — R39.11 URINARY HESITANCY: Primary | ICD-10-CM

## 2021-06-23 DIAGNOSIS — R35.0 URINARY FREQUENCY: ICD-10-CM

## 2021-06-23 PROCEDURE — 71000039 HC RECOVERY, EACH ADD'L HOUR: Performed by: UROLOGY

## 2021-06-23 PROCEDURE — 37000008 HC ANESTHESIA 1ST 15 MINUTES: Performed by: UROLOGY

## 2021-06-23 PROCEDURE — 52000 CYSTOURETHROSCOPY: CPT | Mod: ,,, | Performed by: UROLOGY

## 2021-06-23 PROCEDURE — 52000 PR CYSTOURETHROSCOPY: ICD-10-PCS | Mod: ,,, | Performed by: UROLOGY

## 2021-06-23 PROCEDURE — 00910 ANES TRANSURETHRAL PX NOS: CPT | Performed by: UROLOGY

## 2021-06-23 PROCEDURE — 36000707: Performed by: UROLOGY

## 2021-06-23 PROCEDURE — 36000706: Performed by: UROLOGY

## 2021-06-23 PROCEDURE — 71000016 HC POSTOP RECOV ADDL HR: Performed by: UROLOGY

## 2021-06-23 PROCEDURE — 25000003 PHARM REV CODE 250: Performed by: NURSE ANESTHETIST, CERTIFIED REGISTERED

## 2021-06-23 PROCEDURE — 25000003 PHARM REV CODE 250: Performed by: SPECIALIST

## 2021-06-23 PROCEDURE — 63600175 PHARM REV CODE 636 W HCPCS: Performed by: UROLOGY

## 2021-06-23 PROCEDURE — 37000009 HC ANESTHESIA EA ADD 15 MINS: Performed by: UROLOGY

## 2021-06-23 PROCEDURE — 63600175 PHARM REV CODE 636 W HCPCS: Performed by: SPECIALIST

## 2021-06-23 PROCEDURE — 71000015 HC POSTOP RECOV 1ST HR: Performed by: UROLOGY

## 2021-06-23 PROCEDURE — 63600175 PHARM REV CODE 636 W HCPCS: Performed by: NURSE ANESTHETIST, CERTIFIED REGISTERED

## 2021-06-23 PROCEDURE — 71000033 HC RECOVERY, INTIAL HOUR: Performed by: UROLOGY

## 2021-06-23 RX ORDER — LIDOCAINE HYDROCHLORIDE 20 MG/ML
INJECTION INTRAVENOUS
Status: DISCONTINUED | OUTPATIENT
Start: 2021-06-23 | End: 2021-06-23

## 2021-06-23 RX ORDER — DIPHENHYDRAMINE HYDROCHLORIDE 50 MG/ML
25 INJECTION INTRAMUSCULAR; INTRAVENOUS EVERY 6 HOURS PRN
Status: DISCONTINUED | OUTPATIENT
Start: 2021-06-23 | End: 2021-06-23 | Stop reason: HOSPADM

## 2021-06-23 RX ORDER — FENTANYL CITRATE 50 UG/ML
INJECTION, SOLUTION INTRAMUSCULAR; INTRAVENOUS
Status: DISCONTINUED | OUTPATIENT
Start: 2021-06-23 | End: 2021-06-23

## 2021-06-23 RX ORDER — SODIUM CHLORIDE 0.9 % (FLUSH) 0.9 %
3 SYRINGE (ML) INJECTION
Status: DISCONTINUED | OUTPATIENT
Start: 2021-06-23 | End: 2021-06-23 | Stop reason: HOSPADM

## 2021-06-23 RX ORDER — DEXAMETHASONE SODIUM PHOSPHATE 4 MG/ML
INJECTION, SOLUTION INTRA-ARTICULAR; INTRALESIONAL; INTRAMUSCULAR; INTRAVENOUS; SOFT TISSUE
Status: DISCONTINUED | OUTPATIENT
Start: 2021-06-23 | End: 2021-06-23

## 2021-06-23 RX ORDER — PHENAZOPYRIDINE HYDROCHLORIDE 100 MG/1
200 TABLET, FILM COATED ORAL
Qty: 18 TABLET | Refills: 0 | Status: SHIPPED | OUTPATIENT
Start: 2021-06-23 | End: 2021-07-21

## 2021-06-23 RX ORDER — OXYCODONE HYDROCHLORIDE 5 MG/1
5 TABLET ORAL
Status: DISCONTINUED | OUTPATIENT
Start: 2021-06-23 | End: 2021-06-23 | Stop reason: HOSPADM

## 2021-06-23 RX ORDER — FAMOTIDINE 20 MG/1
20 TABLET, FILM COATED ORAL
Status: COMPLETED | OUTPATIENT
Start: 2021-06-23 | End: 2021-06-23

## 2021-06-23 RX ORDER — CIPROFLOXACIN 500 MG/1
500 TABLET ORAL 2 TIMES DAILY
Qty: 2 TABLET | Refills: 0 | Status: SHIPPED | OUTPATIENT
Start: 2021-06-23 | End: 2021-06-24

## 2021-06-23 RX ORDER — MIDAZOLAM HYDROCHLORIDE 1 MG/ML
INJECTION INTRAMUSCULAR; INTRAVENOUS
Status: DISCONTINUED | OUTPATIENT
Start: 2021-06-23 | End: 2021-06-23

## 2021-06-23 RX ORDER — ONDANSETRON 2 MG/ML
INJECTION INTRAMUSCULAR; INTRAVENOUS
Status: DISCONTINUED | OUTPATIENT
Start: 2021-06-23 | End: 2021-06-23

## 2021-06-23 RX ORDER — CIPROFLOXACIN 2 MG/ML
400 INJECTION, SOLUTION INTRAVENOUS
Status: COMPLETED | OUTPATIENT
Start: 2021-06-23 | End: 2021-06-23

## 2021-06-23 RX ORDER — SODIUM CHLORIDE, SODIUM LACTATE, POTASSIUM CHLORIDE, CALCIUM CHLORIDE 600; 310; 30; 20 MG/100ML; MG/100ML; MG/100ML; MG/100ML
INJECTION, SOLUTION INTRAVENOUS CONTINUOUS
Status: DISCONTINUED | OUTPATIENT
Start: 2021-06-23 | End: 2021-06-23 | Stop reason: HOSPADM

## 2021-06-23 RX ORDER — ACETAMINOPHEN 500 MG
1000 TABLET ORAL
Status: COMPLETED | OUTPATIENT
Start: 2021-06-23 | End: 2021-06-23

## 2021-06-23 RX ORDER — ACETAMINOPHEN 325 MG/1
650 TABLET ORAL EVERY 4 HOURS PRN
Status: DISCONTINUED | OUTPATIENT
Start: 2021-06-23 | End: 2021-06-23 | Stop reason: HOSPADM

## 2021-06-23 RX ORDER — HYDROCODONE BITARTRATE AND ACETAMINOPHEN 5; 325 MG/1; MG/1
1 TABLET ORAL EVERY 4 HOURS PRN
Status: DISCONTINUED | OUTPATIENT
Start: 2021-06-23 | End: 2021-06-23 | Stop reason: HOSPADM

## 2021-06-23 RX ORDER — MEPERIDINE HYDROCHLORIDE 25 MG/ML
12.5 INJECTION INTRAMUSCULAR; INTRAVENOUS; SUBCUTANEOUS ONCE AS NEEDED
Status: DISCONTINUED | OUTPATIENT
Start: 2021-06-23 | End: 2021-06-23 | Stop reason: HOSPADM

## 2021-06-23 RX ORDER — PHENAZOPYRIDINE HYDROCHLORIDE 200 MG/1
200 TABLET, FILM COATED ORAL 3 TIMES DAILY PRN
Status: DISCONTINUED | OUTPATIENT
Start: 2021-06-23 | End: 2021-06-23 | Stop reason: HOSPADM

## 2021-06-23 RX ORDER — HYDROMORPHONE HYDROCHLORIDE 2 MG/ML
0.4 INJECTION, SOLUTION INTRAMUSCULAR; INTRAVENOUS; SUBCUTANEOUS EVERY 5 MIN PRN
Status: DISCONTINUED | OUTPATIENT
Start: 2021-06-23 | End: 2021-06-23 | Stop reason: HOSPADM

## 2021-06-23 RX ORDER — PROPOFOL 10 MG/ML
VIAL (ML) INTRAVENOUS
Status: DISCONTINUED | OUTPATIENT
Start: 2021-06-23 | End: 2021-06-23

## 2021-06-23 RX ORDER — ONDANSETRON 2 MG/ML
4 INJECTION INTRAMUSCULAR; INTRAVENOUS DAILY PRN
Status: DISCONTINUED | OUTPATIENT
Start: 2021-06-23 | End: 2021-06-23 | Stop reason: HOSPADM

## 2021-06-23 RX ORDER — LIDOCAINE HYDROCHLORIDE 10 MG/ML
0.5 INJECTION, SOLUTION EPIDURAL; INFILTRATION; INTRACAUDAL; PERINEURAL ONCE
Status: DISCONTINUED | OUTPATIENT
Start: 2021-06-23 | End: 2021-06-23 | Stop reason: HOSPADM

## 2021-06-23 RX ADMIN — ACETAMINOPHEN 1000 MG: 500 TABLET, FILM COATED ORAL at 07:06

## 2021-06-23 RX ADMIN — ONDANSETRON HYDROCHLORIDE 4 MG: 2 INJECTION INTRAMUSCULAR; INTRAVENOUS at 08:06

## 2021-06-23 RX ADMIN — FAMOTIDINE 20 MG: 20 TABLET ORAL at 07:06

## 2021-06-23 RX ADMIN — PROPOFOL 100 MG: 10 INJECTION, EMULSION INTRAVENOUS at 08:06

## 2021-06-23 RX ADMIN — LIDOCAINE HYDROCHLORIDE 60 MG: 20 INJECTION, SOLUTION INTRAVENOUS at 08:06

## 2021-06-23 RX ADMIN — SODIUM CHLORIDE, SODIUM LACTATE, POTASSIUM CHLORIDE, AND CALCIUM CHLORIDE: 600; 310; 30; 20 INJECTION, SOLUTION INTRAVENOUS at 08:06

## 2021-06-23 RX ADMIN — MIDAZOLAM HYDROCHLORIDE 2 MG: 1 INJECTION, SOLUTION INTRAMUSCULAR; INTRAVENOUS at 08:06

## 2021-06-23 RX ADMIN — FENTANYL CITRATE 50 MCG: 50 INJECTION, SOLUTION INTRAMUSCULAR; INTRAVENOUS at 08:06

## 2021-06-23 RX ADMIN — PROPOFOL 200 MG: 10 INJECTION, EMULSION INTRAVENOUS at 08:06

## 2021-06-23 RX ADMIN — DEXAMETHASONE SODIUM PHOSPHATE 8 MG: 4 INJECTION, SOLUTION INTRAMUSCULAR; INTRAVENOUS at 08:06

## 2021-06-23 RX ADMIN — CIPROFLOXACIN 400 MG: 2 INJECTION, SOLUTION INTRAVENOUS at 08:06

## 2021-07-20 ENCOUNTER — PATIENT OUTREACH (OUTPATIENT)
Dept: ADMINISTRATIVE | Facility: OTHER | Age: 26
End: 2021-07-20

## 2021-07-21 ENCOUNTER — OFFICE VISIT (OUTPATIENT)
Dept: UROLOGY | Facility: CLINIC | Age: 26
End: 2021-07-21
Attending: UROLOGY
Payer: MEDICAID

## 2021-07-21 VITALS
HEART RATE: 82 BPM | DIASTOLIC BLOOD PRESSURE: 60 MMHG | HEIGHT: 68 IN | WEIGHT: 130.06 LBS | SYSTOLIC BLOOD PRESSURE: 105 MMHG | BODY MASS INDEX: 19.71 KG/M2

## 2021-07-21 DIAGNOSIS — R35.0 URINARY FREQUENCY: Primary | ICD-10-CM

## 2021-07-21 DIAGNOSIS — R39.11 URINARY HESITANCY: ICD-10-CM

## 2021-07-21 PROCEDURE — 99214 PR OFFICE/OUTPT VISIT, EST, LEVL IV, 30-39 MIN: ICD-10-PCS | Mod: S$GLB,,, | Performed by: UROLOGY

## 2021-07-21 PROCEDURE — 99214 OFFICE O/P EST MOD 30 MIN: CPT | Mod: S$GLB,,, | Performed by: UROLOGY

## 2021-09-28 PROBLEM — M62.89 PELVIC FLOOR DYSFUNCTION: Status: ACTIVE | Noted: 2021-09-28

## 2021-09-29 ENCOUNTER — CLINICAL SUPPORT (OUTPATIENT)
Dept: REHABILITATION | Facility: HOSPITAL | Age: 26
End: 2021-09-29
Attending: UROLOGY
Payer: MEDICAID

## 2021-09-29 DIAGNOSIS — M62.89 PELVIC FLOOR DYSFUNCTION: ICD-10-CM

## 2021-09-29 DIAGNOSIS — R39.11 URINARY HESITANCY: ICD-10-CM

## 2021-09-29 DIAGNOSIS — R35.0 URINARY FREQUENCY: ICD-10-CM

## 2021-09-29 PROCEDURE — 97112 NEUROMUSCULAR REEDUCATION: CPT | Mod: PO

## 2021-09-29 PROCEDURE — 97162 PT EVAL MOD COMPLEX 30 MIN: CPT | Mod: PO

## 2021-10-08 ENCOUNTER — CLINICAL SUPPORT (OUTPATIENT)
Dept: REHABILITATION | Facility: HOSPITAL | Age: 26
End: 2021-10-08
Payer: MEDICAID

## 2021-10-08 DIAGNOSIS — M62.89 PELVIC FLOOR DYSFUNCTION: Primary | ICD-10-CM

## 2021-10-08 PROCEDURE — 97112 NEUROMUSCULAR REEDUCATION: CPT | Mod: PO

## 2021-10-08 PROCEDURE — 97140 MANUAL THERAPY 1/> REGIONS: CPT | Mod: PO

## 2021-10-09 ENCOUNTER — IMMUNIZATION (OUTPATIENT)
Dept: INTERNAL MEDICINE | Facility: CLINIC | Age: 26
End: 2021-10-09
Payer: MEDICAID

## 2021-10-09 PROCEDURE — 90686 IIV4 VACC NO PRSV 0.5 ML IM: CPT | Mod: PBBFAC,PO

## 2021-10-15 ENCOUNTER — CLINICAL SUPPORT (OUTPATIENT)
Dept: REHABILITATION | Facility: HOSPITAL | Age: 26
End: 2021-10-15
Payer: MEDICAID

## 2021-10-15 DIAGNOSIS — M62.89 PELVIC FLOOR DYSFUNCTION: Primary | ICD-10-CM

## 2021-10-15 PROCEDURE — 97112 NEUROMUSCULAR REEDUCATION: CPT | Mod: PO

## 2021-10-15 PROCEDURE — 97140 MANUAL THERAPY 1/> REGIONS: CPT | Mod: PO

## 2021-10-27 ENCOUNTER — OFFICE VISIT (OUTPATIENT)
Dept: UROLOGY | Facility: CLINIC | Age: 26
End: 2021-10-27
Attending: UROLOGY
Payer: MEDICAID

## 2021-10-27 VITALS
BODY MASS INDEX: 19.7 KG/M2 | HEIGHT: 68 IN | HEART RATE: 72 BPM | WEIGHT: 130 LBS | DIASTOLIC BLOOD PRESSURE: 60 MMHG | SYSTOLIC BLOOD PRESSURE: 126 MMHG

## 2021-10-27 DIAGNOSIS — R39.11 URINARY HESITANCY: Primary | ICD-10-CM

## 2021-10-27 DIAGNOSIS — R35.0 URINARY FREQUENCY: ICD-10-CM

## 2021-10-27 PROCEDURE — 99213 PR OFFICE/OUTPT VISIT, EST, LEVL III, 20-29 MIN: ICD-10-PCS | Mod: S$GLB,,, | Performed by: UROLOGY

## 2021-10-27 PROCEDURE — 99213 OFFICE O/P EST LOW 20 MIN: CPT | Mod: S$GLB,,, | Performed by: UROLOGY

## 2021-10-29 ENCOUNTER — CLINICAL SUPPORT (OUTPATIENT)
Dept: REHABILITATION | Facility: HOSPITAL | Age: 26
End: 2021-10-29
Payer: MEDICAID

## 2021-10-29 DIAGNOSIS — M62.89 PELVIC FLOOR DYSFUNCTION: Primary | ICD-10-CM

## 2021-10-29 PROCEDURE — 97140 MANUAL THERAPY 1/> REGIONS: CPT | Mod: PO

## 2021-11-08 LAB
BILIRUB SERPL-MCNC: NEGATIVE MG/DL
BLOOD URINE, POC: NEGATIVE
CLARITY, POC UA: CLEAR
COLOR, POC UA: YELLOW
GLUCOSE UR QL STRIP: NEGATIVE
KETONES UR QL STRIP: NORMAL
LEUKOCYTE ESTERASE URINE, POC: NEGATIVE
NITRITE, POC UA: NEGATIVE
PH, POC UA: 7
PROTEIN, POC: NEGATIVE
SPECIFIC GRAVITY, POC UA: 1.01
UROBILINOGEN, POC UA: NORMAL

## 2021-11-19 ENCOUNTER — CLINICAL SUPPORT (OUTPATIENT)
Dept: REHABILITATION | Facility: HOSPITAL | Age: 26
End: 2021-11-19
Payer: MEDICAID

## 2021-11-19 DIAGNOSIS — M62.89 PELVIC FLOOR DYSFUNCTION: Primary | ICD-10-CM

## 2021-11-19 PROCEDURE — 97140 MANUAL THERAPY 1/> REGIONS: CPT | Mod: PO

## 2022-02-04 ENCOUNTER — IMMUNIZATION (OUTPATIENT)
Dept: PHARMACY | Facility: CLINIC | Age: 27
End: 2022-02-04
Payer: MEDICAID

## 2022-02-04 DIAGNOSIS — Z23 NEED FOR VACCINATION: Primary | ICD-10-CM

## 2022-03-11 ENCOUNTER — OFFICE VISIT (OUTPATIENT)
Dept: INTERNAL MEDICINE | Facility: CLINIC | Age: 27
End: 2022-03-11
Payer: MEDICAID

## 2022-03-11 VITALS
BODY MASS INDEX: 20.89 KG/M2 | SYSTOLIC BLOOD PRESSURE: 140 MMHG | OXYGEN SATURATION: 60 % | WEIGHT: 137.81 LBS | HEART RATE: 60 BPM | TEMPERATURE: 98 F | RESPIRATION RATE: 18 BRPM | HEIGHT: 68 IN | DIASTOLIC BLOOD PRESSURE: 74 MMHG

## 2022-03-11 DIAGNOSIS — H53.9 VISUAL CHANGES: ICD-10-CM

## 2022-03-11 DIAGNOSIS — G43.109 MIGRAINE WITH AURA AND WITHOUT STATUS MIGRAINOSUS, NOT INTRACTABLE: Primary | ICD-10-CM

## 2022-03-11 DIAGNOSIS — R51.9 WORSENING HEADACHES: ICD-10-CM

## 2022-03-11 PROCEDURE — 1160F RVW MEDS BY RX/DR IN RCRD: CPT | Mod: CPTII,,, | Performed by: INTERNAL MEDICINE

## 2022-03-11 PROCEDURE — 1159F PR MEDICATION LIST DOCUMENTED IN MEDICAL RECORD: ICD-10-PCS | Mod: CPTII,,, | Performed by: INTERNAL MEDICINE

## 2022-03-11 PROCEDURE — 99214 PR OFFICE/OUTPT VISIT, EST, LEVL IV, 30-39 MIN: ICD-10-PCS | Mod: S$PBB,,, | Performed by: INTERNAL MEDICINE

## 2022-03-11 PROCEDURE — 99999 PR PBB SHADOW E&M-EST. PATIENT-LVL IV: ICD-10-PCS | Mod: PBBFAC,,, | Performed by: INTERNAL MEDICINE

## 2022-03-11 PROCEDURE — 3078F DIAST BP <80 MM HG: CPT | Mod: CPTII,,, | Performed by: INTERNAL MEDICINE

## 2022-03-11 PROCEDURE — 99214 OFFICE O/P EST MOD 30 MIN: CPT | Mod: S$PBB,,, | Performed by: INTERNAL MEDICINE

## 2022-03-11 PROCEDURE — 1160F PR REVIEW ALL MEDS BY PRESCRIBER/CLIN PHARMACIST DOCUMENTED: ICD-10-PCS | Mod: CPTII,,, | Performed by: INTERNAL MEDICINE

## 2022-03-11 PROCEDURE — 3008F BODY MASS INDEX DOCD: CPT | Mod: CPTII,,, | Performed by: INTERNAL MEDICINE

## 2022-03-11 PROCEDURE — 99214 OFFICE O/P EST MOD 30 MIN: CPT | Mod: PBBFAC,PO | Performed by: INTERNAL MEDICINE

## 2022-03-11 PROCEDURE — 3078F PR MOST RECENT DIASTOLIC BLOOD PRESSURE < 80 MM HG: ICD-10-PCS | Mod: CPTII,,, | Performed by: INTERNAL MEDICINE

## 2022-03-11 PROCEDURE — 3077F PR MOST RECENT SYSTOLIC BLOOD PRESSURE >= 140 MM HG: ICD-10-PCS | Mod: CPTII,,, | Performed by: INTERNAL MEDICINE

## 2022-03-11 PROCEDURE — 99999 PR PBB SHADOW E&M-EST. PATIENT-LVL IV: CPT | Mod: PBBFAC,,, | Performed by: INTERNAL MEDICINE

## 2022-03-11 PROCEDURE — 1159F MED LIST DOCD IN RCRD: CPT | Mod: CPTII,,, | Performed by: INTERNAL MEDICINE

## 2022-03-11 PROCEDURE — 3008F PR BODY MASS INDEX (BMI) DOCUMENTED: ICD-10-PCS | Mod: CPTII,,, | Performed by: INTERNAL MEDICINE

## 2022-03-11 PROCEDURE — 3077F SYST BP >= 140 MM HG: CPT | Mod: CPTII,,, | Performed by: INTERNAL MEDICINE

## 2022-03-11 RX ORDER — SUMATRIPTAN SUCCINATE 100 MG/1
100 TABLET ORAL
Qty: 18 TABLET | Refills: 2 | Status: SHIPPED | OUTPATIENT
Start: 2022-03-11 | End: 2022-05-02 | Stop reason: SDUPTHER

## 2022-03-11 NOTE — PROGRESS NOTES
Subjective:       Patient ID: Patel Ghotra is a 26 y.o. male.    Chief Complaint: Migraine (Constant migraine pt vision becomes Blurry. ) and Blurred Vision    HPI   Pt with hx of migraines is here c/o worsening headaches over the past year or so. Headaches are preceded with aura. He does admit to headaches almost on a daily basis. Some relief with NSAIDS which he dose not take on a regular basis.  Review of Systems   Constitutional: Negative for activity change, appetite change, chills, diaphoresis, fatigue, fever and unexpected weight change.   HENT: Negative for postnasal drip, rhinorrhea, sinus pressure/congestion, sneezing, sore throat, trouble swallowing and voice change.    Eyes: Positive for visual disturbance.   Respiratory: Negative for cough, shortness of breath and wheezing.    Cardiovascular: Negative for chest pain, palpitations and leg swelling.   Gastrointestinal: Negative for abdominal pain, blood in stool, constipation, diarrhea, nausea and vomiting.   Genitourinary: Negative for dysuria.   Musculoskeletal: Negative for arthralgias and myalgias.   Integumentary:  Negative for rash and wound.   Allergic/Immunologic: Negative for environmental allergies and food allergies.   Neurological: Positive for headaches.   Hematological: Negative for adenopathy. Does not bruise/bleed easily.         Objective:      Physical Exam  Constitutional:       General: He is not in acute distress.     Appearance: He is well-developed. He is not diaphoretic.   HENT:      Head: Normocephalic and atraumatic.      Right Ear: External ear normal.      Left Ear: External ear normal.      Nose: Nose normal.      Mouth/Throat:      Pharynx: No oropharyngeal exudate.   Eyes:      General: No scleral icterus.        Right eye: No discharge.         Left eye: No discharge.      Conjunctiva/sclera: Conjunctivae normal.      Pupils: Pupils are equal, round, and reactive to light.   Neck:      Vascular: No JVD.    Cardiovascular:      Rate and Rhythm: Normal rate and regular rhythm.      Heart sounds: Normal heart sounds. No murmur heard.  Pulmonary:      Effort: Pulmonary effort is normal. No respiratory distress.      Breath sounds: Normal breath sounds. No wheezing or rales.   Musculoskeletal:      Cervical back: Normal range of motion and neck supple.   Lymphadenopathy:      Cervical: No cervical adenopathy.   Skin:     General: Skin is warm and dry.      Coloration: Skin is not pale.      Findings: No rash.   Neurological:      General: No focal deficit present.      Mental Status: He is alert and oriented to person, place, and time.      Cranial Nerves: No cranial nerve deficit.      Sensory: No sensory deficit.      Motor: No weakness.      Coordination: Coordination normal.      Gait: Gait normal.      Deep Tendon Reflexes: Reflexes normal.         Assessment:       Problem List Items Addressed This Visit    None     Visit Diagnoses     Migraine with aura and without status migrainosus, not intractable    -  Primary    Relevant Medications    sumatriptan (IMITREX) 100 MG tablet    Other Relevant Orders    MRI Brain Without Contrast    Ambulatory referral/consult to Neurology    Worsening headaches        Relevant Orders    MRI Brain Without Contrast    Ambulatory referral/consult to Neurology    Visual changes        Relevant Orders    MRI Brain Without Contrast    Ambulatory referral/consult to Neurology          Plan:    MRI brain   Referral to neuro   Rx Imitrex PRN

## 2022-03-14 ENCOUNTER — TELEPHONE (OUTPATIENT)
Dept: NEUROLOGY | Facility: CLINIC | Age: 27
End: 2022-03-14
Payer: MEDICAID

## 2022-03-14 NOTE — TELEPHONE ENCOUNTER
Called and spoke with pt. Offered 7 day release with nixon parmar on 04/01/22 at 11am Accepted offer. Will schedule once released.

## 2022-03-14 NOTE — TELEPHONE ENCOUNTER
----- Message from Penelope Orantes sent at 3/11/2022  3:04 PM CST -----  Good Afternoon,    Dr. Torin Crane has put in a referral for Mr. Ghotra to schedule with Neurology. Please give him a call to assist with scheduling.    Migraine with aura and without status migrainosus, not intractable [G43.109]  Worsening headaches [R51.9]  Visual changes [H53.9]    Thank you in advance,  Penelope

## 2022-03-21 ENCOUNTER — HOSPITAL ENCOUNTER (OUTPATIENT)
Dept: RADIOLOGY | Facility: HOSPITAL | Age: 27
Discharge: HOME | End: 2022-03-21
Attending: INTERNAL MEDICINE
Payer: MEDICAID

## 2022-03-21 DIAGNOSIS — G43.109 MIGRAINE WITH AURA AND WITHOUT STATUS MIGRAINOSUS, NOT INTRACTABLE: ICD-10-CM

## 2022-03-21 DIAGNOSIS — H53.9 VISUAL CHANGES: ICD-10-CM

## 2022-03-21 DIAGNOSIS — R51.9 WORSENING HEADACHES: ICD-10-CM

## 2022-03-21 PROCEDURE — 70551 MRI BRAIN WITHOUT CONTRAST: ICD-10-PCS | Mod: 26,,, | Performed by: RADIOLOGY

## 2022-03-21 PROCEDURE — 70551 MRI BRAIN STEM W/O DYE: CPT | Mod: TC

## 2022-03-21 PROCEDURE — 70551 MRI BRAIN STEM W/O DYE: CPT | Mod: 26,,, | Performed by: RADIOLOGY

## 2022-04-25 ENCOUNTER — TELEPHONE (OUTPATIENT)
Dept: NEUROLOGY | Facility: CLINIC | Age: 27
End: 2022-04-25
Payer: MEDICAID

## 2022-04-27 ENCOUNTER — TELEPHONE (OUTPATIENT)
Dept: FAMILY MEDICINE | Facility: CLINIC | Age: 27
End: 2022-04-27
Payer: MEDICAID

## 2022-04-27 NOTE — TELEPHONE ENCOUNTER
"----- Message from Bernadine Washburn sent at 4/27/2022  3:40 PM CDT -----  Regarding: Appointment  "Type:  Patient Call Back    Who Called:CHAI MCKEON [[id number]]    What is the reqeust in detail:pt is scheduled today for 3:40 and running ten minutes late. Please advise    Can the clinic reply by [facility]?no    Best Call Back Number:[phone]      Additional Information:            "

## 2022-05-02 ENCOUNTER — OFFICE VISIT (OUTPATIENT)
Dept: NEUROLOGY | Facility: CLINIC | Age: 27
End: 2022-05-02
Payer: MEDICAID

## 2022-05-02 VITALS — DIASTOLIC BLOOD PRESSURE: 66 MMHG | SYSTOLIC BLOOD PRESSURE: 103 MMHG | HEART RATE: 75 BPM

## 2022-05-02 DIAGNOSIS — R51.9 WORSENING HEADACHES: ICD-10-CM

## 2022-05-02 DIAGNOSIS — G43.109 MIGRAINE WITH AURA AND WITHOUT STATUS MIGRAINOSUS, NOT INTRACTABLE: Primary | ICD-10-CM

## 2022-05-02 DIAGNOSIS — H53.9 VISUAL CHANGES: ICD-10-CM

## 2022-05-02 DIAGNOSIS — G44.86 CERVICOGENIC HEADACHE: ICD-10-CM

## 2022-05-02 PROCEDURE — 1160F RVW MEDS BY RX/DR IN RCRD: CPT | Mod: CPTII,,, | Performed by: PHYSICIAN ASSISTANT

## 2022-05-02 PROCEDURE — 3078F PR MOST RECENT DIASTOLIC BLOOD PRESSURE < 80 MM HG: ICD-10-PCS | Mod: CPTII,,, | Performed by: PHYSICIAN ASSISTANT

## 2022-05-02 PROCEDURE — 3074F SYST BP LT 130 MM HG: CPT | Mod: CPTII,,, | Performed by: PHYSICIAN ASSISTANT

## 2022-05-02 PROCEDURE — 99213 OFFICE O/P EST LOW 20 MIN: CPT | Mod: PBBFAC | Performed by: PHYSICIAN ASSISTANT

## 2022-05-02 PROCEDURE — 99204 PR OFFICE/OUTPT VISIT, NEW, LEVL IV, 45-59 MIN: ICD-10-PCS | Mod: S$PBB,,, | Performed by: PHYSICIAN ASSISTANT

## 2022-05-02 PROCEDURE — 99999 PR PBB SHADOW E&M-EST. PATIENT-LVL III: CPT | Mod: PBBFAC,,, | Performed by: PHYSICIAN ASSISTANT

## 2022-05-02 PROCEDURE — 1160F PR REVIEW ALL MEDS BY PRESCRIBER/CLIN PHARMACIST DOCUMENTED: ICD-10-PCS | Mod: CPTII,,, | Performed by: PHYSICIAN ASSISTANT

## 2022-05-02 PROCEDURE — 99999 PR PBB SHADOW E&M-EST. PATIENT-LVL III: ICD-10-PCS | Mod: PBBFAC,,, | Performed by: PHYSICIAN ASSISTANT

## 2022-05-02 PROCEDURE — 1159F PR MEDICATION LIST DOCUMENTED IN MEDICAL RECORD: ICD-10-PCS | Mod: CPTII,,, | Performed by: PHYSICIAN ASSISTANT

## 2022-05-02 PROCEDURE — 99204 OFFICE O/P NEW MOD 45 MIN: CPT | Mod: S$PBB,,, | Performed by: PHYSICIAN ASSISTANT

## 2022-05-02 PROCEDURE — 1159F MED LIST DOCD IN RCRD: CPT | Mod: CPTII,,, | Performed by: PHYSICIAN ASSISTANT

## 2022-05-02 PROCEDURE — 3074F PR MOST RECENT SYSTOLIC BLOOD PRESSURE < 130 MM HG: ICD-10-PCS | Mod: CPTII,,, | Performed by: PHYSICIAN ASSISTANT

## 2022-05-02 PROCEDURE — 3078F DIAST BP <80 MM HG: CPT | Mod: CPTII,,, | Performed by: PHYSICIAN ASSISTANT

## 2022-05-02 RX ORDER — SUMATRIPTAN SUCCINATE 100 MG/1
TABLET ORAL
Qty: 12 TABLET | Refills: 5 | Status: SHIPPED | OUTPATIENT
Start: 2022-05-02 | End: 2022-07-08 | Stop reason: ALTCHOICE

## 2022-05-02 RX ORDER — ONDANSETRON 8 MG/1
8 TABLET, ORALLY DISINTEGRATING ORAL EVERY 12 HOURS PRN
Qty: 20 TABLET | Refills: 6 | Status: SHIPPED | OUTPATIENT
Start: 2022-05-02

## 2022-05-02 NOTE — PROGRESS NOTES
Subjective:         Patient ID: Patel Ghotra is a 26 y.o. male.  Referred by:   Torin Crane, Do  [address] [facility] [place] [address]  Chief Complaint:  No chief complaint on file.      History of Present Illness  This is a 26 y.o. male with a PMHx of migraine headaches, who presents to clinic alone as a referral from their primary care provider for evaluation of headaches.  Patient notes he has been suffering from migraine headaches since he was in his teens, but notes over the past year he has noticed a worsening in the severity of his headaches  as well as recently began experiencing a  visual aura with his headaches, he describe his aura as blurry vision,with electrostatic that moves horizontally, lightening bolts, bright halos in his vision and then symptoms are followed by a severe migraine headache within 15 minutes after the symptoms begin. He describes his headaches as a severe 10/10 aching pressure like pain that occurs mostly in his frontalis and sometimes will radiate to his occipitalis bilaterally and are associated with photophobia,Visual blurriness, phonophobia, Nausea, ringing in both ears, and neck tightness. He notes his headaches were more frequent when he was younger, but feels the severity and aura symptoms have worsened in the last year or so which he attributes to increased stress, which is a known trigger for his headaches. He notes over the last few months he has has had about 3/30 HA days and each of his headaches can last up to 24 hours and he sometimes experiences post-drome of fatigue for half of the next day after his a migraine headache subsides. He notes he was recently prescribed Imitrex 100 mg by his PCP  with some relief, and notes he has only taken it 2 times and it did reduce the severity of his headache. He notes prior to this he had tried fioricet, tylenol and naproxen with no relief.   Full headache description below:   Location - frontalis and  sometimes will radiate to his occipitalis bilaterally   Nature of pain -  Huge aching pressure like pain   Duration - 12-24.5 hours   Range of intensity -  At their mildest a 6/10, notes he has a mild headache today that is a 6/10, om average his headaches are a 10//10 and he will have to go lie down under the covers and wait for he pain to subside,   Frequency - 3 /30 HA days in the past month  Time of day of most headaches- anytime   Prodrome/Aura -  Yes visual aura described as blurry vision, electrostatic that moves horizontally, lightening bolts, bright halos   Triggers - stress, excessive sugar,   Aggravating/Alleviating Factors - Pain worsened with physical activity,     Family history of headaches- yes his grandma had migraine headaches   Caffeine- yes soda 2-3x a week   Alcohol - socially   Smoke - used to smoke cigarettes, but quit when he was 18 y.o   Associated symptoms with the headache include: photophobia, Visual blurriness, phonophobia, Nausea, ringing in both ears, and neck tightness.    Medications tried and failed: fioricet, naproxen, tyelenol    Past Medical History:   Diagnosis Date    Dental infection     Palpitations     patient reports related to anxiety    Urinary hesitancy        Past Surgical History:   Procedure Laterality Date    CYSTOSCOPY N/A 6/23/2021    Procedure: CYSTOSCOPY;  Surgeon: Jenn Tapia MD;  Location: [facility];  Service: Urology;  Laterality: N/A;    WISDOM TOOTH EXTRACTION         Family History   Problem Relation Age of Onset    Diabetes Mother     Heart disease Father     Drug abuse Father     Diabetes Maternal Grandmother     Hypertension Maternal Grandmother        Social History     Socioeconomic History    Marital status: Single   Occupational History    Occupation: Student   Tobacco Use    Smoking status: Former Smoker     Types: Cigarettes     Quit date: 11/11/2018     Years since quitting: 3.4    Smokeless tobacco: Never Used    Tobacco  comment: every other day   Substance and Sexual Activity    Alcohol use: Yes     Alcohol/week: 0.0 standard drinks     Comment: social    Drug use: Yes     Types: Marijuana    Sexual activity: Yes     Partners: Female     Birth control/protection: Condom     Social Determinants of Health     Financial Resource Strain: High Risk    Difficulty of Paying Living Expenses: Very hard   Food Insecurity: Food Insecurity Present    Worried About Running Out of Food in the Last Year: Often true    Ran Out of Food in the Last Year: Often true   Transportation Needs: Unmet Transportation Needs    Lack of Transportation (Medical): Patient refused    Lack of Transportation (Non-Medical): Yes   Physical Activity: Insufficiently Active    Days of Exercise per Week: 2 days    Minutes of Exercise per Session: 10 min   Stress: Stress Concern Present    Feeling of Stress : Very much   Social Connections: Unknown    Frequency of Communication with Friends and Family: Patient refused    Frequency of Social Gatherings with Friends and Family: Patient refused    Active Member of Clubs or Organizations: Patient refused    Attends Club or Organization Meetings: Patient refused    Marital Status: Never    Housing Stability: Unknown    Unable to Pay for Housing in the Last Year: Patient refused    Unstable Housing in the Last Year: Patient refused       Review of Systems: as per HPI, otherwise a balanced 10 systems review is negative  Objective:   There were no vitals filed for this visit.    Physical Exam   Constitutional: he appears well-developed and well-nourished. he is well groomed. NAD  HENT:    Head: Normocephalic and atraumatic, Frontalis was NTTP, temporalis was NTTP   Eyes: Conjunctivae and EOM are normal. Pupils are equal, round, and reactive to light   Neck: Neck supple.  No Tenderness to palpation bilateral cervical paraspinal musculature, suboccipital musculature, negative tinel sign of greater and  lesser occipital nerves bilaterally   Musculoskeletal: Normal range of motion. No joint stiffness. No vertebral point tenderness.  Skin: Skin is warm and dry.  Psychiatric: Normal mood and affect.     Neuro exam:    Mental status:  The patient is alert and oriented to person, place and time.  Language is intact and fluent  Remote and recent memory are intact  Normal attention and concentration  Mood is stable    Cranial Nerves:  Pupils are equal and reactive to light.    Extraocular movements are intact and without nystagmus.    Visual fields are full to confrontation testing.   Facial movement is symmetric.  Facial sensation is intact.    Hearing is intact   Limited neck ROM in left lateral direction due to neck pain otherwise FROM in neck right lateral direction, neck flexion, and neck extension without pain  Shoulder shrug symmetrical.    Coordination:     Finger to nose - normal and symmetric bilaterally     Motor:  Normal muscle bulk and symmetry. No fasciculations were noted.   Tremor not apparent   Pronator drift not apparent.    strength was strong and symmetric  Finger extension strength was strong and symmetric  RUE:appropriate against gravity and medium force as tested 5/5  LUE: appropriate against gravity and medium force as tested 5/5  RLE:appropriate against gravity and medium force as tested 5/5              LLE: appropriate against gravity and medium force as tested 5/5    Reflexes:  Right Brachioradialis 2+  Left Brachioradialis 2+  Right Biceps 2+  Left Biceps 2+  Right Patellar2+  Left Patellar 2+  Right Achilles 2+  Left Achilles 2+                          Hall was negative    Sensory:  RUE  intact light touch  LUE intact light touch  RLE intact light touch  LLE intact light touch    Gait:   Romberg - negative  Normal gait  Tandem, Heel, and Toe Walk - able to perform without difficulty      IMAGING Reviewed:   Results for orders placed or performed during the hospital encounter of  03/21/22   MRI Brain Without Contrast    Narrative    EXAMINATION:  MRI BRAIN WITHOUT CONTRAST    CLINICAL HISTORY:  Headache, chronic, new features or increased frequency;Migraine with aura, not intractable, without status migrainosus    TECHNIQUE:  Multiplanar multisequence MR imaging of the brain was performed without intravenous contrast.    COMPARISON:  No priors    FINDINGS:  Intracranial Compartment:    Ventricles are normal in size for age without evidence of hydrocephalus.    The brain parenchyma appears within normal limits.  No mass, hemorrhage, or recent or remote major vascular distribution infarct.    No extra-axial blood or fluid collections.    Normal vascular flow voids are preserved.    Skull/Extracranial Contents (limited evaluation):    Bone marrow signal intensity is normal.      Impression    No evidence of acute intracranial pathology.      Electronically signed by: Anirudh Vargas MD  Date:    03/22/2022  Time:    06:17        Note: I have independently reviewed any/all imaging/labs/tests and agree with the report (s) as documented.  Any discrepancies will be as noted/demarcated by free text. VINICIUS REYNA 5/2/2022  Assessment/Plan:     Problem List Items Addressed This Visit    None     Visit Diagnoses     Migraine with aura and without status migrainosus, not intractable    -  Primary    Relevant Medications    sumatriptan (IMITREX) 100 MG tablet    ondansetron (ZOFRAN-ODT) 8 MG TbDL    Other Relevant Orders    Ambulatory referral/consult to Physical/Occupational Therapy    Worsening headaches        Visual changes        Cervicogenic headache        Relevant Orders    Ambulatory referral/consult to Physical/Occupational Therapy          This is a 26 y.o. male who presents for evaluation of headaches. Discussed symptoms appear to be consistent with migraine headaches with visual aura , discussed treatment options and patient agreed with the following plan:  Abortive medication  - continue trial  "of sumatriptan 100 mg as needed for abortive tx of headaches. Instructed patient to take at the onset of his aura and to record in headahce diary whether medication was effective, disucssed to try this 2-3 times and if no benefit to let me know and we can switch to trial of maxalt instead.    Will  refer patient to physical therapy for neck range of motion exercises to address subtle cervicogenic component to his headaches.   Instructed patient  to start tracking headaches, discussed "Migraine Buddy" gretel for smart phone and gave paper headache diary and to look out for particular triggers of headaches.Discussed the importance of practicing good sleep hygiene, minimizing stress/ stress coping techniques, healthy diet, and minimizing caffeine intake to a maximum of 100-200 mg /day.     I will see patient back for follow up in 2 months via virtual visit, or sooner if symptoms worsen or do not subside with current medication.     I have discussed realistic goals of care with patient at length as well as medication options, and need for lifestyle adjustment. I have explained that treatment will take time. We have agreed that the goal will be to reduce frequency/intensity/quantity of HA, not to be completely HA free. Patient agreeable to work on lifestyle adjustments.  The patient verbalizes understanding and agreement with the treatment plan. Questions were sought and answered to patients stated verbal satisfaction.     Chuy Johnson PA-C  [facility]  Department of Neurology     Collaborating Physician, Dr. Doan, was available during today's encounter.         "

## 2022-06-10 ENCOUNTER — TELEPHONE (OUTPATIENT)
Dept: INTERNAL MEDICINE | Facility: CLINIC | Age: 27
End: 2022-06-10
Payer: MEDICAID

## 2022-06-10 ENCOUNTER — OFFICE VISIT (OUTPATIENT)
Dept: INTERNAL MEDICINE | Facility: CLINIC | Age: 27
End: 2022-06-10
Payer: MEDICAID

## 2022-06-10 VITALS
OXYGEN SATURATION: 99 % | RESPIRATION RATE: 18 BRPM | WEIGHT: 127.63 LBS | SYSTOLIC BLOOD PRESSURE: 130 MMHG | TEMPERATURE: 98 F | BODY MASS INDEX: 19.34 KG/M2 | HEART RATE: 50 BPM | HEIGHT: 68 IN | DIASTOLIC BLOOD PRESSURE: 70 MMHG

## 2022-06-10 DIAGNOSIS — L30.9 DERMATITIS: Primary | ICD-10-CM

## 2022-06-10 PROCEDURE — 99999 PR PBB SHADOW E&M-EST. PATIENT-LVL III: ICD-10-PCS | Mod: PBBFAC,,, | Performed by: INTERNAL MEDICINE

## 2022-06-10 PROCEDURE — 3075F PR MOST RECENT SYSTOLIC BLOOD PRESS GE 130-139MM HG: ICD-10-PCS | Mod: CPTII,,, | Performed by: INTERNAL MEDICINE

## 2022-06-10 PROCEDURE — 1159F MED LIST DOCD IN RCRD: CPT | Mod: CPTII,,, | Performed by: INTERNAL MEDICINE

## 2022-06-10 PROCEDURE — 99213 OFFICE O/P EST LOW 20 MIN: CPT | Mod: PBBFAC,PO | Performed by: INTERNAL MEDICINE

## 2022-06-10 PROCEDURE — 3078F DIAST BP <80 MM HG: CPT | Mod: CPTII,,, | Performed by: INTERNAL MEDICINE

## 2022-06-10 PROCEDURE — 3075F SYST BP GE 130 - 139MM HG: CPT | Mod: CPTII,,, | Performed by: INTERNAL MEDICINE

## 2022-06-10 PROCEDURE — 99214 PR OFFICE/OUTPT VISIT, EST, LEVL IV, 30-39 MIN: ICD-10-PCS | Mod: S$PBB,,, | Performed by: INTERNAL MEDICINE

## 2022-06-10 PROCEDURE — 99214 OFFICE O/P EST MOD 30 MIN: CPT | Mod: S$PBB,,, | Performed by: INTERNAL MEDICINE

## 2022-06-10 PROCEDURE — 3008F PR BODY MASS INDEX (BMI) DOCUMENTED: ICD-10-PCS | Mod: CPTII,,, | Performed by: INTERNAL MEDICINE

## 2022-06-10 PROCEDURE — 1159F PR MEDICATION LIST DOCUMENTED IN MEDICAL RECORD: ICD-10-PCS | Mod: CPTII,,, | Performed by: INTERNAL MEDICINE

## 2022-06-10 PROCEDURE — 3078F PR MOST RECENT DIASTOLIC BLOOD PRESSURE < 80 MM HG: ICD-10-PCS | Mod: CPTII,,, | Performed by: INTERNAL MEDICINE

## 2022-06-10 PROCEDURE — 3008F BODY MASS INDEX DOCD: CPT | Mod: CPTII,,, | Performed by: INTERNAL MEDICINE

## 2022-06-10 PROCEDURE — 99999 PR PBB SHADOW E&M-EST. PATIENT-LVL III: CPT | Mod: PBBFAC,,, | Performed by: INTERNAL MEDICINE

## 2022-06-10 RX ORDER — IBUPROFEN 800 MG/1
800 TABLET ORAL 3 TIMES DAILY
COMMUNITY

## 2022-06-10 RX ORDER — TRIAMCINOLONE ACETONIDE 1 MG/G
CREAM TOPICAL 2 TIMES DAILY
Qty: 28.4 G | Refills: 1 | Status: SHIPPED | OUTPATIENT
Start: 2022-06-10 | End: 2022-07-06

## 2022-06-10 NOTE — TELEPHONE ENCOUNTER
verified rx with pharmacy    triamcinolone acetonide 0.1% (KENALOG) 0.1 % cream 28.4 g 1 6/10/2022  --   Sig - Route: Apply topically 2 (two) times daily. - Topical (Top)

## 2022-06-10 NOTE — PROGRESS NOTES
Subjective:       Patient ID: Patel Ghotra is a 26 y.o. male.    Chief Complaint: Nail Problem (Rt Ring Finger possible infection )    HPI   Pt here for evaluation of a possible rash of his right middle ring finger which has been present for the last few days. No nail involvement, swelling or open wounds.   Review of Systems   Constitutional: Negative for activity change, appetite change, chills, diaphoresis, fatigue, fever and unexpected weight change.   HENT: Negative for postnasal drip, rhinorrhea, sinus pressure/congestion, sneezing, sore throat, trouble swallowing and voice change.    Respiratory: Negative for cough, shortness of breath and wheezing.    Cardiovascular: Negative for chest pain, palpitations and leg swelling.   Gastrointestinal: Negative for abdominal pain, blood in stool, constipation, diarrhea, nausea and vomiting.   Genitourinary: Negative for dysuria.   Musculoskeletal: Negative for arthralgias and myalgias.   Integumentary:  Positive for rash. Negative for wound.   Allergic/Immunologic: Negative for environmental allergies and food allergies.   Hematological: Negative for adenopathy. Does not bruise/bleed easily.         Objective:      Physical Exam  Constitutional:       General: He is not in acute distress.     Appearance: He is well-developed. He is not diaphoretic.   HENT:      Head: Normocephalic and atraumatic.      Right Ear: External ear normal.      Left Ear: External ear normal.      Nose: Nose normal.      Mouth/Throat:      Pharynx: No oropharyngeal exudate.   Eyes:      General: No scleral icterus.        Right eye: No discharge.         Left eye: No discharge.      Conjunctiva/sclera: Conjunctivae normal.      Pupils: Pupils are equal, round, and reactive to light.   Neck:      Vascular: No JVD.   Cardiovascular:      Rate and Rhythm: Normal rate and regular rhythm.      Heart sounds: Normal heart sounds. No murmur heard.  Pulmonary:      Effort: Pulmonary effort is  normal. No respiratory distress.      Breath sounds: Normal breath sounds. No wheezing or rales.   Musculoskeletal:        Hands:       Cervical back: Normal range of motion and neck supple.      Comments: Trace erythema   Lymphadenopathy:      Cervical: No cervical adenopathy.   Skin:     General: Skin is warm and dry.      Coloration: Skin is not pale.      Findings: No rash.   Neurological:      Mental Status: He is alert and oriented to person, place, and time.         Assessment:       Problem List Items Addressed This Visit    None     Visit Diagnoses     Dermatitis    -  Primary    Relevant Medications    triamcinolone acetonide 0.1% (KENALOG) 0.1 % cream          Plan:    Rx Triamcinolone cream BID PRN

## 2022-06-10 NOTE — TELEPHONE ENCOUNTER
----- Message from Betsy Blair sent at 6/10/2022  4:45 PM CDT -----  Contact: Chuy @[facility] [phone]  Pharmacy is calling to clarify an RX.    RX name:  triamcinolone acetonide 0.1% (KENALOG) 0.1 % cream    What do they need to clarify:  verify rx due to a power outage    Comments:

## 2022-06-14 NOTE — PROGRESS NOTES
Subjective:       Patel Ghotra is a 26 y.o. male who is an established patient who was referred by Dr Houser for evaluation of LUTS.      He reports urinary hesitancy, noted since age 16. Can take up to 30s to get started. Worsening x 3 months. Occasional mild dysuria, worsened when dehydrated. Intermittent stream. +PVD, sensation of SRIKANTH, frequent straining. Frequency q1h. Medium stream that gets weaker. Nocturia x 0-1. Decreased urge in AM recently. Remote history of inserting a thermometer into urethra at age 9, none since. Noted some blood in urine at that time. Not sexually active x 8 month. +chronic constipation (takes probiotics and lactase). Decreased ejaculation volume and force. Denies ED, though may have some problems maintaining.     May have had UTI at age 18. Recent UAs normal. GC/CT testing negative. No prior history of STDs.      PVR (bladder scan) today - 3cc    Taking Flomax without much improvement. Notes R scrotal numbness. Most bothersome - urinary hesitancy, dribbling at end of stream. Symptoms variable. Still with constipation. Recent root canal requiring abx and pain meds.     OR cystoscopy 6/23/21 - normal. No urethral strictures. No bladder lesions. Prostate normal.     Started PFPT in 9/21. Noted modest improvement thus far. Was going to PT about once/week. Last saw them 11/2021.     Feels constipation has been the biggest impact on voiding issues. Also notes anxiety/stress issues have impacted his voiding.   PVR (bladder scan) today - 95cc      The following portions of the patient's history were reviewed and updated as appropriate: allergies, current medications, past family history, past medical history, past social history, past surgical history and problem list.    Review of Systems  12 point review of systems completed. Pertinent positive and negatives listed in HPI.      Objective:    Vitals: /65   Pulse (!) 52     Physical Exam   General: well developed, well  nourished in no acute distress  Head: normocephalic, atraumatic  Neck: supple, trachea midline, no obvious enlargement of thyroid  HEENT: EOMI, mucus membranes moist, sclera anicteric, no hearing impairment  Lungs: symmetric expansion, non-labored breathing  Cardiovascular: regular rate and rhythm, normal pulses  Skin: no rashes or lesions  Neuro: alert and oriented x 3, no gross deficits  Psych: normal judgment and insight, normal mood/affect and non-anxious  Genitourinary: (last visit)  Penis -  circumcised penis without plaques, lesions, or scarring.  Urethra - orthotopic location without stenosis.  Scrotum  - no lesions or rashes, no hydrocele or hernia.  Testes - descended bilaterally, symmetric without masses, non tender.  Epididymides - no cysts or lesions, no spermatocele, symmetric   No meatal stenosis. Remainder exam normal.   LANG: deferred      Lab Review   Urine analysis today in clinic shows Negative     Lab Results   Component Value Date    WBC 4.95 11/10/2020    HGB 15.2 11/10/2020    HCT 45.4 11/10/2020    MCV 89 11/10/2020     11/10/2020     Lab Results   Component Value Date    CREATININE 0.9 11/10/2020    BUN 10 11/10/2020     No results found for: PSA  No results found for: PSADIAG    Imaging  NA       Assessment/Plan:      1. Urinary hesitancy    - Emptying well, PVR low. UA clear.    - Discussed DDx: urethral stricture, high bladder neck, dysfunctional voiding     - Flomax without significant improvement - stopped   - Cystoscopy - done in OR 6/23/21. Normal.    - Anatomy normal. Started PFPT in 9/21 - modest improvement.   - Agree with bowel regimen to avoid constipation that can worsen pelvic floor issues.   - He is also interested in therapy to help with stress, this would also likely be beneficial in his pelvic floor dysfunction     2. Urinary frequency    - Consider voiding diary       Follow up in 7 months

## 2022-06-15 ENCOUNTER — OFFICE VISIT (OUTPATIENT)
Dept: UROLOGY | Facility: CLINIC | Age: 27
End: 2022-06-15
Attending: UROLOGY
Payer: MEDICAID

## 2022-06-15 VITALS — SYSTOLIC BLOOD PRESSURE: 129 MMHG | DIASTOLIC BLOOD PRESSURE: 65 MMHG | HEART RATE: 52 BPM

## 2022-06-15 DIAGNOSIS — R35.0 URINARY FREQUENCY: ICD-10-CM

## 2022-06-15 DIAGNOSIS — M62.89 PELVIC FLOOR TENSION: ICD-10-CM

## 2022-06-15 DIAGNOSIS — R39.11 URINARY HESITANCY: Primary | ICD-10-CM

## 2022-06-15 LAB
BILIRUB SERPL-MCNC: NEGATIVE MG/DL
BLOOD URINE, POC: NEGATIVE
CLARITY, POC UA: CLEAR
COLOR, POC UA: YELLOW
GLUCOSE UR QL STRIP: NORMAL
KETONES UR QL STRIP: NEGATIVE
LEUKOCYTE ESTERASE URINE, POC: NEGATIVE
NITRITE, POC UA: NEGATIVE
PH, POC UA: 6
POC RESIDUAL URINE VOLUME: 95 ML (ref 0–100)
PROTEIN, POC: NEGATIVE
SPECIFIC GRAVITY, POC UA: 1.01
UROBILINOGEN, POC UA: NORMAL

## 2022-06-15 PROCEDURE — 99214 OFFICE O/P EST MOD 30 MIN: CPT | Mod: S$GLB,,, | Performed by: UROLOGY

## 2022-06-15 PROCEDURE — 1159F MED LIST DOCD IN RCRD: CPT | Mod: CPTII,S$GLB,, | Performed by: UROLOGY

## 2022-06-15 PROCEDURE — 1159F PR MEDICATION LIST DOCUMENTED IN MEDICAL RECORD: ICD-10-PCS | Mod: CPTII,S$GLB,, | Performed by: UROLOGY

## 2022-06-15 PROCEDURE — 3078F DIAST BP <80 MM HG: CPT | Mod: CPTII,S$GLB,, | Performed by: UROLOGY

## 2022-06-15 PROCEDURE — 81002 POCT URINE DIPSTICK WITHOUT MICROSCOPE: ICD-10-PCS | Mod: S$GLB,,, | Performed by: UROLOGY

## 2022-06-15 PROCEDURE — 99214 PR OFFICE/OUTPT VISIT, EST, LEVL IV, 30-39 MIN: ICD-10-PCS | Mod: S$GLB,,, | Performed by: UROLOGY

## 2022-06-15 PROCEDURE — 51798 POCT BLADDER SCAN: ICD-10-PCS | Mod: S$GLB,,, | Performed by: UROLOGY

## 2022-06-15 PROCEDURE — 1160F PR REVIEW ALL MEDS BY PRESCRIBER/CLIN PHARMACIST DOCUMENTED: ICD-10-PCS | Mod: CPTII,S$GLB,, | Performed by: UROLOGY

## 2022-06-15 PROCEDURE — 3074F PR MOST RECENT SYSTOLIC BLOOD PRESSURE < 130 MM HG: ICD-10-PCS | Mod: CPTII,S$GLB,, | Performed by: UROLOGY

## 2022-06-15 PROCEDURE — 51798 US URINE CAPACITY MEASURE: CPT | Mod: S$GLB,,, | Performed by: UROLOGY

## 2022-06-15 PROCEDURE — 3074F SYST BP LT 130 MM HG: CPT | Mod: CPTII,S$GLB,, | Performed by: UROLOGY

## 2022-06-15 PROCEDURE — 81002 URINALYSIS NONAUTO W/O SCOPE: CPT | Mod: S$GLB,,, | Performed by: UROLOGY

## 2022-06-15 PROCEDURE — 1160F RVW MEDS BY RX/DR IN RCRD: CPT | Mod: CPTII,S$GLB,, | Performed by: UROLOGY

## 2022-06-15 PROCEDURE — 3078F PR MOST RECENT DIASTOLIC BLOOD PRESSURE < 80 MM HG: ICD-10-PCS | Mod: CPTII,S$GLB,, | Performed by: UROLOGY

## 2022-06-21 ENCOUNTER — CLINICAL SUPPORT (OUTPATIENT)
Dept: REHABILITATION | Facility: HOSPITAL | Age: 27
End: 2022-06-21
Payer: MEDICAID

## 2022-06-21 DIAGNOSIS — G44.86 CERVICOGENIC HEADACHE: ICD-10-CM

## 2022-06-21 DIAGNOSIS — M54.2 NECK PAIN ON LEFT SIDE: ICD-10-CM

## 2022-06-21 DIAGNOSIS — R29.3 POOR POSTURE: ICD-10-CM

## 2022-06-21 DIAGNOSIS — G43.109 MIGRAINE WITH AURA AND WITHOUT STATUS MIGRAINOSUS, NOT INTRACTABLE: ICD-10-CM

## 2022-06-21 PROCEDURE — 97110 THERAPEUTIC EXERCISES: CPT | Mod: PO

## 2022-06-21 PROCEDURE — 97161 PT EVAL LOW COMPLEX 20 MIN: CPT | Mod: PO

## 2022-06-21 NOTE — PROGRESS NOTES
See evaluation in POC for goals and assessment.      Eval Date: 6/21/2022     Nell Mata PT, DPT

## 2022-06-21 NOTE — PLAN OF CARE
[facility]  Physical Therapy Initial Evaluation    Date: 6/21/2022   Name: Patel Owusu [place]  Clinic Number: [id number]    Therapy Diagnosis:   Encounter Diagnoses   Name Primary?    Migraine with aura and without status migrainosus, not intractable     Cervicogenic headache     Poor posture     Neck pain on left side      Physician: Chuy Johnson PA-C    Physician Orders: PT Eval and Treat   Medical Diagnosis from Referral: Migraine with aura and without status migrainosus, not intractable [G43.109], Cervicogenic headache [G44.86]    Evaluation Date: 6/21/2022  Authorization Period Expiration: 5/2/23  Plan of Care Expiration: 9/21/22  Visit # / Visits authorized: 1/ 1  FOTO: 0/3 not given at eval  Precautions: Standard      Time In: 1100  Time Out: 1145  Total Appointment Time (timed & untimed codes): 10 minutes    Subjective   Date of onset: chronic  History of current condition - Cesario reports: gradual onset and worsening of L sided neck pain that has started to affect his comfort w/work tasks, driving and majority of his ADLs. He reports riding an e-bike for 1-2 hrs some days and feels more neck pain after being in that position w/using the handles.      Prior Therapy: yes, for his back and pelvic floor  Social History:  lives alone, rents a room from an older lady.   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 Started this about a week ago, wants to gain more weight, has started eating more.   Current Level of Function: still lifting KB (5-10lb)   Patients goals: relieve neck pain, be able to get stronger and progress in weightlifting, building muscle mass      Pain:  Current 3/10, worst 6/10, best 0/10 at rest.  Location:  L side of neck   Description: Aching, Tight and sharp  Aggravating Factors: Sitting, Laying, Bending, Morning and Lifting  Easing  Factors: nothing    Medical History:   Past Medical History:   Diagnosis Date    Dental infection     Palpitations     patient reports related to anxiety    Urinary hesitancy        Surgical History:   aPtel Ghotra  has a past surgical history that includes [place] tooth extraction and Cystoscopy (N/A, 6/23/2021).    Medications:   Patel has a current medication list which includes the following prescription(s): ibuprofen, ondansetron, sumatriptan, and triamcinolone acetonide 0.1%.    Allergies:   Review of patient's allergies indicates:   Allergen Reactions    Amoxicillin Rash        Imaging: none        Objective   Posture: rounded shoulders, slight FHP/slouched at rest. In supine pt w/slight L rotation at rest.   Scapular rhythm and alignment: B winging, hypermobile in general, R shoulder IR painful at end range (pt notes he broke his R arm as a kid so this has always been limited)  Palpation:   Joint Mobility:     Cervicothoracic Range of Motion: * indicates pain w/AROM    % of WNL Pain/Dysfunction   Flexion 50 Notes it feels hard to bend forward   Extension 60 deg  no pain   Right Rotation 70  *   Left Rotation 50 *    Right Side Bending 40     Left Side Bending  40 * pulling pain on R side       Cervical flexor endurance test: 1 seconds, difficulty     Upper Extremity Strength:    * indicates pain w/AROM    L UE  RUE Pain/Dysfunction   Shoulder flexion: 5/5 5/5    Shoulder Abduction: 4/5 4/5    Shoulder ER 4/5 4/5    Shoulder IR 5/5 5/5*    Elbow flexion: 5/5 5/5    Elbow extension: 5/5 5/5    Middle traps/rhomboids 3-/5 3-/5    Lower traps: 3-/5 3-/5    Upper traps 5/5 5/5           TREATMENT   Treatment Time In: 1115  Treatment Time Out: 1125  Total Treatment time (time-based codes) separate from Evaluation: 10 minutes    Cesario received therapeutic exercises to develop flexibility, posture and core stabilization for 10 minutes including:    HEP: levator scap stretch, open books      Cesario  received the following manual therapy techniques: Myofacial release and Soft tissue Mobilization were applied to the: neck and upper back for 5 minutes, including:  -- attempted MT to decrease pain in pt's neck but he was unable to relax his neck and shoulders enough to allow for benefit.     Home Exercises and Patient Education Provided    Education provided:   - cueing and demo for all new exercises done this date.  - rationale behind PT POC established    Written Home Exercises Provided: yes.  Exercises were reviewed and Cesario was able to demonstrate them prior to the end of the session.  Cesario demonstrated good  understanding of the education provided.     See EMR under Patient Instructions for exercises provided 6/21/2022.    Assessment   Patel is a 26 y.o. male referred to outpatient Physical Therapy with a medical diagnosis of Migraine with aura and without status migrainosus, not intractable [G43.109], Cervicogenic headache [G44.86]    Patient presents with  general hypermobility but lacks adequate deep cervical flexor strength to provide stability which is likely causing his increased neck muscle tightness. Pt reports having migraines since he was a teen and is actively being treated for them with different medications from his physician. Will plan to implement PT regimen in order to adjust any underlying musculoskeletal contribution to his migraines as able. Pt is motivated to participate.     Patient prognosis is Excellent.   Patient will benefit from skilled outpatient Physical Therapy to address the deficits stated above and in the chart below, provide patient /family education, and to maximize patient's level of independence.     Plan of care discussed with patient: Yes  Patient's spiritual, cultural and educational needs considered and patient is agreeable to the plan of care and goals as stated below:     Anticipated Barriers for therapy: none    Medical Necessity is demonstrated by the  following  History  Co-morbidities and personal factors that may impact the plan of care Co-morbidities:   young age  Past Medical History:   Diagnosis Date    Dental infection     Palpitations     patient reports related to anxiety    Urinary hesitancy      Personal Factors:   no deficits     low   Examination  Body Structures and Functions, activity limitations and participation restrictions that may impact the plan of care Body Regions:   neck    Body Systems:    gross symmetry  ROM  strength    Participation Restrictions:        Activity limitations:   Learning and applying knowledge  no deficits    General Tasks and Commands  no deficits    Communication  no deficits    Mobility  lifting and carrying objects    Self care  no deficits    Domestic Life  no deficits    Interactions/Relationships  no deficits    Life Areas  no deficits    Community and Social Life  no deficits         low   Clinical Presentation stable and uncomplicated low   Decision Making/ Complexity Score: low     Goals:  Short Term Goals (4 Weeks):   1. Pt will be compliant with HEP to assist in progressing PT POC and restoring LOF.   2. Pt will report decrease in neck pain to <4/10 average.  3. Pt will improve Cervical Rotation by >10% as compared to IE in order to improve driving  4. Pt will display good upright posture at rest w/o requiring cueing.    Long Term Goals (12 Weeks):   1. Pt will report improved completion of ADLs w/less pain (sleeping, carrying, driving, shopping, self care, working)  2. Pt will display improved Cervical Flexion, sidebending and ext to >75% as compared to IE in order to improve functional mobility of cervical spine.  3. Pt will be able to demonstrate upright posture for full tx session without verbal cuing in order to improve work ergonomics and posture.       Plan   Plan of care Certification: 6/21/2022 to 9/21/22.    Outpatient Physical Therapy 0-3 times weekly for up to 12 weeks including the following  interventions: Aquatic Therapy, Manual Therapy, Moist Heat/ Ice, Neuromuscular Re-ed, Patient Education, Self Care, Therapeutic Activities and Therapeutic Exercise.     Nell Mata, PT

## 2022-06-24 ENCOUNTER — CLINICAL SUPPORT (OUTPATIENT)
Dept: REHABILITATION | Facility: HOSPITAL | Age: 27
End: 2022-06-24
Payer: MEDICAID

## 2022-06-24 DIAGNOSIS — R29.3 POOR POSTURE: Primary | ICD-10-CM

## 2022-06-24 DIAGNOSIS — M54.2 NECK PAIN ON LEFT SIDE: ICD-10-CM

## 2022-06-24 PROCEDURE — 97110 THERAPEUTIC EXERCISES: CPT | Mod: PO

## 2022-06-24 NOTE — PROGRESS NOTES
Physical Therapy Treatment Note     Name: Patel Owusu [facility] Number: [id number]    Therapy Diagnosis:   Encounter Diagnoses   Name Primary?    Poor posture Yes    Neck pain on left side      Physician: Chuy Johnson PA-C    Visit Date: 6/24/2022    Physician Orders: PT Eval and Treat   Medical Diagnosis from Referral: Migraine with aura and without status migrainosus, not intractable [G43.109], Cervicogenic headache [G44.86]     Evaluation Date: 6/21/2022  Authorization Period Expiration: 5/2/23  Plan of Care Expiration: 9/21/22  Visit # / Visits authorized: 1/ 1  FOTO: 0/3 not given at eval  Precautions: Standard      Time In: 0948 pt arrived 17 mins late for session.   Time Out: 1030  Total Billable Time: 42 minutes    Precautions: Standard    Subjective     Pt reports: he was sore the next day after eval bc he thinks he slept wrong, and it's still sore from that today.  He was compliant with home exercise program.  Response to previous treatment: WNL, IE completed  Functional change: none mentioned    Pain: 4/10  Location: B sides of neck      Objective     Cesario received therapeutic exercises to develop strength, endurance, ROM and flexibility for 40 minutes including:  HEP reviewed as needed: levator scap stretch, open books  FR routine: cane overhead stretch 10x10  B shoulder ER w/BTB x20  +freemotion rows single arm 7lb x10, second set 3lb x10  UBE L1 x4mins fwd /bkwd  +Wall angels 2x10  +orange SB roll up wall 10x10      Home Exercises Provided and Patient Education Provided     Education provided:   - cueing for all new exercises done today  - explained rationale of treatments done today.     Written Home Exercises Provided: Patient instructed to cont prior HEP.  Exercises were reviewed and Cesario was able to demonstrate them prior to the end of the session.  Cesario demonstrated good  understanding of the education provided.     See EMR under Patient Instructions for exercises provided prior  visit.    Assessment     Pt tolerated session well. Displays good learning following demonstration and tactile cueing to correct his posture during new exercises given today.     Cesario Is progressing well towards his goals.   Pt prognosis is Excellent.     Pt will continue to benefit from skilled outpatient physical therapy to address the deficits listed in the problem list box on initial evaluation, provide pt/family education and to maximize pt's level of independence in the home and community environment.     Pt's spiritual, cultural and educational needs considered and pt agreeable to plan of care and goals.     Anticipated barriers to physical therapy: none    Goals:  Short Term Goals (4 Weeks):   1. Pt will be compliant with HEP to assist in progressing PT POC and restoring LOF.   2. Pt will report decrease in neck pain to <4/10 average.  3. Pt will improve Cervical Rotation by >10% as compared to IE in order to improve driving  4. Pt will display good upright posture at rest w/o requiring cueing.     Long Term Goals (12 Weeks):   1. Pt will report improved completion of ADLs w/less pain (sleeping, carrying, driving, shopping, self care, working)  2. Pt will display improved Cervical Flexion, sidebending and ext to >75% as compared to IE in order to improve functional mobility of cervical spine.  3. Pt will be able to demonstrate upright posture for full tx session without verbal cuing in order to improve work ergonomics and posture.         Plan   Plan of care Certification: 6/21/2022 to 9/21/22.     Outpatient Physical Therapy 0-3 times weekly for up to 12 weeks including the following interventions: Aquatic Therapy, Manual Therapy, Moist Heat/ Ice, Neuromuscular Re-ed, Patient Education, Self Care, Therapeutic Activities and Therapeutic Exercise.          Nell Mata, PT

## 2022-07-01 ENCOUNTER — CLINICAL SUPPORT (OUTPATIENT)
Dept: REHABILITATION | Facility: HOSPITAL | Age: 27
End: 2022-07-01
Payer: MEDICAID

## 2022-07-01 ENCOUNTER — PATIENT OUTREACH (OUTPATIENT)
Dept: ADMINISTRATIVE | Facility: HOSPITAL | Age: 27
End: 2022-07-01
Payer: MEDICAID

## 2022-07-01 DIAGNOSIS — M54.2 NECK PAIN ON LEFT SIDE: ICD-10-CM

## 2022-07-01 DIAGNOSIS — R29.3 POOR POSTURE: Primary | ICD-10-CM

## 2022-07-01 PROCEDURE — 97110 THERAPEUTIC EXERCISES: CPT | Mod: PO

## 2022-07-01 NOTE — PROGRESS NOTES
"  Physical Therapy Treatment Note     Name: Patel Owusu [facility] Number: [id number]    Therapy Diagnosis:   Encounter Diagnoses   Name Primary?    Poor posture Yes    Neck pain on left side      Physician: Chuy Johnson PA-C    Visit Date: 7/1/2022    Physician Orders: PT Eval and Treat   Medical Diagnosis from Referral: Migraine with aura and without status migrainosus, not intractable [G43.109], Cervicogenic headache [G44.86]     Evaluation Date: 6/21/2022  Authorization Period Expiration: 5/2/23  Plan of Care Expiration: 9/21/22  Visit # / Visits authorized: 1/ 1  FOTO: 0/3 not given at eval  Precautions: Standard      Time In: 11:40 pt arrived 10 mins late for session.   Time Out: 1030  Total Billable Time: 35 minutes    Precautions: Standard    Subjective     Pt reports: that he has been having head aches for some time.  He reported that his last Physical Therapist suggested that he try dry needling for his neck pain and head aches.  He was compliant with home exercise program.  Response to previous treatment: WNL, IE completed  Functional change: none mentioned    Pain: 4/10  Location: B sides of neck      Objective   Posture: R side of pelvis elevated as compared to the L.  Forward head and rounded shoulders    Cervical Spine AROM  Flexion - 70% posterior neck "tightness" reported   Extension - 50% no pain  Rotation: R = 50%, L = 50% - Pt reported stiffness to the same side  Side bending: R = 50%, L = 50% - Pt reported tightness to the opposite side.    B UE sensation - intact    Cesario received manual therapy x 20 min including manual traction, soft tissue mobilization and dry needling.  Manual Cx spine traction  Soft tissue mobilization to the Cx paraspinals and B upper traps  Passive mobilization to the Cx and thoracic spine  Dry needling to:  B suboccipitals  B C1-2  B upper traps        Cesario received therapeutic exercises to develop strength, endurance, ROM and flexibility for 00 minutes " including:  HEP reviewed as needed: levator scap stretch, open books  FR routine: cane overhead stretch 10x10  B shoulder ER w/BTB x20  +freemotion rows single arm 7lb x10, second set 3lb x10  UBE L1 x4mins fwd /bkwd  +Wall angels 2x10  +orange SB roll up wall 10x10    Pt reviewed and signed an informed release for Dry needing on 7/1/2022      Home Exercises Provided and Patient Education Provided     Education provided:   - cueing for all new exercises done today  - explained rationale of treatments done today.     Written Home Exercises Provided: Patient instructed to cont prior HEP.  Exercises were reviewed and Cesario was able to demonstrate them prior to the end of the session.  Cesario demonstrated good  understanding of the education provided.     See EMR under Patient Instructions for exercises provided prior visit.    Assessment     Pt was able to tolerate the re-evaluation without an increase in his Sx.  He reported some discomfort with dry need, but overall was able tolerate the treatment.  Evaluate Pt's response to dry needling.   Cesario Is progressing well towards his goals.   Pt prognosis is Excellent.     Pt will continue to benefit from skilled outpatient physical therapy to address the deficits listed in the problem list box on initial evaluation, provide pt/family education and to maximize pt's level of independence in the home and community environment.     Pt's spiritual, cultural and educational needs considered and pt agreeable to plan of care and goals.     Anticipated barriers to physical therapy: none    Goals:  Short Term Goals (4 Weeks):   1. Pt will be compliant with HEP to assist in progressing PT POC and restoring LOF.   2. Pt will report decrease in neck pain to <4/10 average.  3. Pt will improve Cervical Rotation by >10% as compared to IE in order to improve driving  4. Pt will display good upright posture at rest w/o requiring cueing.     Long Term Goals (12 Weeks):   1. Pt will report  improved completion of ADLs w/less pain (sleeping, carrying, driving, shopping, self care, working)  2. Pt will display improved Cervical Flexion, sidebending and ext to >75% as compared to IE in order to improve functional mobility of cervical spine.  3. Pt will be able to demonstrate upright posture for full tx session without verbal cuing in order to improve work ergonomics and posture.         Plan   Plan of care Certification: 6/21/2022 to 9/21/22.     Outpatient Physical Therapy 0-3 times weekly for up to 12 weeks including the following interventions: Aquatic Therapy, Manual Therapy, Moist Heat/ Ice, Neuromuscular Re-ed, Patient Education, Self Care, Therapeutic Activities and Therapeutic Exercise.          Pedro Cook, PT

## 2022-07-06 ENCOUNTER — OFFICE VISIT (OUTPATIENT)
Dept: INTERNAL MEDICINE | Facility: CLINIC | Age: 27
End: 2022-07-06
Payer: MEDICAID

## 2022-07-06 VITALS
DIASTOLIC BLOOD PRESSURE: 82 MMHG | RESPIRATION RATE: 18 BRPM | WEIGHT: 132.5 LBS | HEIGHT: 68 IN | HEART RATE: 76 BPM | TEMPERATURE: 98 F | BODY MASS INDEX: 20.08 KG/M2 | OXYGEN SATURATION: 98 % | SYSTOLIC BLOOD PRESSURE: 120 MMHG

## 2022-07-06 DIAGNOSIS — G43.109 MIGRAINE WITH AURA AND WITHOUT STATUS MIGRAINOSUS, NOT INTRACTABLE: ICD-10-CM

## 2022-07-06 DIAGNOSIS — Z00.00 ANNUAL PHYSICAL EXAM: Primary | ICD-10-CM

## 2022-07-06 PROCEDURE — 3074F PR MOST RECENT SYSTOLIC BLOOD PRESSURE < 130 MM HG: ICD-10-PCS | Mod: CPTII,,, | Performed by: INTERNAL MEDICINE

## 2022-07-06 PROCEDURE — 3079F PR MOST RECENT DIASTOLIC BLOOD PRESSURE 80-89 MM HG: ICD-10-PCS | Mod: CPTII,,, | Performed by: INTERNAL MEDICINE

## 2022-07-06 PROCEDURE — 1159F MED LIST DOCD IN RCRD: CPT | Mod: CPTII,,, | Performed by: INTERNAL MEDICINE

## 2022-07-06 PROCEDURE — 1160F RVW MEDS BY RX/DR IN RCRD: CPT | Mod: CPTII,,, | Performed by: INTERNAL MEDICINE

## 2022-07-06 PROCEDURE — 1159F PR MEDICATION LIST DOCUMENTED IN MEDICAL RECORD: ICD-10-PCS | Mod: CPTII,,, | Performed by: INTERNAL MEDICINE

## 2022-07-06 PROCEDURE — 99395 PR PREVENTIVE VISIT,EST,18-39: ICD-10-PCS | Mod: S$PBB,,, | Performed by: INTERNAL MEDICINE

## 2022-07-06 PROCEDURE — 1160F PR REVIEW ALL MEDS BY PRESCRIBER/CLIN PHARMACIST DOCUMENTED: ICD-10-PCS | Mod: CPTII,,, | Performed by: INTERNAL MEDICINE

## 2022-07-06 PROCEDURE — 99999 PR PBB SHADOW E&M-EST. PATIENT-LVL III: CPT | Mod: PBBFAC,,, | Performed by: INTERNAL MEDICINE

## 2022-07-06 PROCEDURE — 99999 PR PBB SHADOW E&M-EST. PATIENT-LVL III: ICD-10-PCS | Mod: PBBFAC,,, | Performed by: INTERNAL MEDICINE

## 2022-07-06 PROCEDURE — 3008F PR BODY MASS INDEX (BMI) DOCUMENTED: ICD-10-PCS | Mod: CPTII,,, | Performed by: INTERNAL MEDICINE

## 2022-07-06 PROCEDURE — 3079F DIAST BP 80-89 MM HG: CPT | Mod: CPTII,,, | Performed by: INTERNAL MEDICINE

## 2022-07-06 PROCEDURE — 99213 OFFICE O/P EST LOW 20 MIN: CPT | Mod: PBBFAC,PO | Performed by: INTERNAL MEDICINE

## 2022-07-06 PROCEDURE — 3074F SYST BP LT 130 MM HG: CPT | Mod: CPTII,,, | Performed by: INTERNAL MEDICINE

## 2022-07-06 PROCEDURE — 3008F BODY MASS INDEX DOCD: CPT | Mod: CPTII,,, | Performed by: INTERNAL MEDICINE

## 2022-07-06 PROCEDURE — 99395 PREV VISIT EST AGE 18-39: CPT | Mod: S$PBB,,, | Performed by: INTERNAL MEDICINE

## 2022-07-06 NOTE — PROGRESS NOTES
Subjective:       Patient ID: Patel Ghotra is a 26 y.o. male.    Chief Complaint: Annual Exam    HPI   26 y.o. Male here for annual exam.     Vaccines: Influenza (2020); Tetanus (2015)  Eye exam: declined    Exercise: walks  Diet: regular     Past Medical History:  No date: Dental infection  No date: Migraine with aura and without status migrainosus, not   intractable  No date: Palpitations      Comment:  patient reports related to anxiety  No date: Urinary hesitancy  Past Surgical History:  6/23/2021: CYSTOSCOPY; N/A      Comment:  Procedure: CYSTOSCOPY;  Surgeon: Jenn Tapia MD;  Location: [facility];  Service: Urology;  Laterality:                N/A;  No date: WISDOM TOOTH EXTRACTION  Social History    Socioeconomic History      Marital status: Single    Occupational History      Occupation: Student    Tobacco Use      Smoking status: Former Smoker        Types: Cigarettes        Quit date: 11/11/2018        Years since quitting: 3.6      Smokeless tobacco: Never Used      Tobacco comment: every other day    Substance and Sexual Activity      Alcohol use: Yes        Alcohol/week: 0.0 standard drinks        Comment: social      Drug use: Yes        Frequency: 20.0 times per week        Types: Marijuana        Comment: joints per week      Sexual activity: Not Currently        Partners: Female        Birth control/protection: Condom    Social Determinants of Health  Financial Resource Strain: High Risk      Difficulty of Paying Living Expenses: Hard  Food Insecurity: Food Insecurity Present      Worried About Running Out of Food in the Last Year: Often true      Ran Out of Food in the Last Year: Sometimes true  Transportation Needs: Unmet Transportation Needs      Lack of Transportation (Medical): Yes      Lack of Transportation (Non-Medical): Yes  Physical Activity: Insufficiently Active      Days of Exercise per Week: 2 days      Minutes of Exercise per Session: 60 min  Stress:  Stress Concern Present      Feeling of Stress : To some extent  Social Connections: Unknown      Frequency of Communication with Friends and Family: Patient refused      Frequency of Social Gatherings with Friends and Family: Patient refused      Active Member of Clubs or Organizations: No      Attends Club or Organization Meetings: Patient refused      Marital Status: Never   Housing Stability: Unknown      Unable to Pay for Housing in the Last Year: Patient refused      Number of Places Lived in the Last Year: 1      Unstable Housing in the Last Year: Patient refused  Review of patient's allergies indicates:   -- Amoxicillin -- Rash  Cesario JSOE Ghotra had no medications administered during this visit.    Review of Systems   Constitutional: Negative for activity change, appetite change, chills, diaphoresis, fatigue, fever and unexpected weight change.   HENT: Negative for nasal congestion, mouth sores, postnasal drip, rhinorrhea, sinus pressure/congestion, sneezing, sore throat, trouble swallowing and voice change.    Eyes: Negative for discharge, itching and visual disturbance.   Respiratory: Negative for cough, chest tightness, shortness of breath and wheezing.    Cardiovascular: Negative for chest pain, palpitations and leg swelling.   Gastrointestinal: Negative for abdominal pain, blood in stool, constipation, diarrhea, nausea and vomiting.   Endocrine: Negative for cold intolerance and heat intolerance.   Genitourinary: Negative for difficulty urinating, dysuria, flank pain, hematuria and urgency.   Musculoskeletal: Negative for arthralgias, back pain, myalgias and neck pain.   Integumentary:  Negative for rash and wound.   Allergic/Immunologic: Negative for environmental allergies and food allergies.   Neurological: Positive for headaches. Negative for dizziness, tremors, seizures, syncope and weakness.   Hematological: Negative for adenopathy. Does not bruise/bleed easily.   Psychiatric/Behavioral:  Negative for confusion, self-injury, sleep disturbance and suicidal ideas. The patient is not nervous/anxious.          Objective:      Physical Exam  Constitutional:       General: He is not in acute distress.     Appearance: He is well-developed. He is not diaphoretic.   HENT:      Head: Normocephalic and atraumatic.      Right Ear: External ear normal.      Left Ear: External ear normal.      Nose: Nose normal.      Mouth/Throat:      Pharynx: No oropharyngeal exudate.   Eyes:      General: No scleral icterus.        Right eye: No discharge.         Left eye: No discharge.      Conjunctiva/sclera: Conjunctivae normal.      Pupils: Pupils are equal, round, and reactive to light.   Neck:      Thyroid: No thyromegaly.      Vascular: No JVD.   Cardiovascular:      Rate and Rhythm: Normal rate and regular rhythm.      Heart sounds: Normal heart sounds. No murmur heard.  Pulmonary:      Effort: Pulmonary effort is normal. No respiratory distress.      Breath sounds: Normal breath sounds. No wheezing or rales.   Abdominal:      General: Bowel sounds are normal. There is no distension.      Palpations: Abdomen is soft.      Tenderness: There is no abdominal tenderness. There is no guarding.   Musculoskeletal:      Cervical back: Normal range of motion and neck supple.      Right lower leg: No edema.      Left lower leg: No edema.   Lymphadenopathy:      Cervical: No cervical adenopathy.   Skin:     General: Skin is warm and dry.      Coloration: Skin is not pale.      Findings: No rash.   Neurological:      Mental Status: He is alert and oriented to person, place, and time.   Psychiatric:         Judgment: Judgment normal.         Assessment:       Problem List Items Addressed This Visit        Neuro    Migraine with aura and without status migrainosus, not intractable      Other Visit Diagnoses     Annual physical exam    -  Primary    Relevant Orders    CBC Auto Differential    Comprehensive Metabolic Panel    TSH     Lipid Panel    Urinalysis          Plan:    Blood work ordered      Migraines- stable on Imitrex PRN     F/u in 1 yr

## 2022-07-08 ENCOUNTER — OFFICE VISIT (OUTPATIENT)
Dept: NEUROLOGY | Facility: CLINIC | Age: 27
End: 2022-07-08
Payer: MEDICAID

## 2022-07-08 DIAGNOSIS — G44.86 CERVICOGENIC HEADACHE: ICD-10-CM

## 2022-07-08 DIAGNOSIS — G43.109 MIGRAINE WITH AURA AND WITHOUT STATUS MIGRAINOSUS, NOT INTRACTABLE: Primary | ICD-10-CM

## 2022-07-08 PROCEDURE — 99214 PR OFFICE/OUTPT VISIT, EST, LEVL IV, 30-39 MIN: ICD-10-PCS | Mod: 95,,, | Performed by: PHYSICIAN ASSISTANT

## 2022-07-08 PROCEDURE — 1159F PR MEDICATION LIST DOCUMENTED IN MEDICAL RECORD: ICD-10-PCS | Mod: CPTII,95,, | Performed by: PHYSICIAN ASSISTANT

## 2022-07-08 PROCEDURE — 1160F PR REVIEW ALL MEDS BY PRESCRIBER/CLIN PHARMACIST DOCUMENTED: ICD-10-PCS | Mod: CPTII,95,, | Performed by: PHYSICIAN ASSISTANT

## 2022-07-08 PROCEDURE — 99214 OFFICE O/P EST MOD 30 MIN: CPT | Mod: 95,,, | Performed by: PHYSICIAN ASSISTANT

## 2022-07-08 PROCEDURE — 1159F MED LIST DOCD IN RCRD: CPT | Mod: CPTII,95,, | Performed by: PHYSICIAN ASSISTANT

## 2022-07-08 PROCEDURE — 1160F RVW MEDS BY RX/DR IN RCRD: CPT | Mod: CPTII,95,, | Performed by: PHYSICIAN ASSISTANT

## 2022-07-08 RX ORDER — RIZATRIPTAN BENZOATE 10 MG/1
TABLET, ORALLY DISINTEGRATING ORAL
Qty: 12 TABLET | Refills: 5 | Status: SHIPPED | OUTPATIENT
Start: 2022-07-08 | End: 2022-12-19

## 2022-07-08 RX ORDER — AMITRIPTYLINE HYDROCHLORIDE 25 MG/1
25 TABLET, FILM COATED ORAL NIGHTLY
Qty: 30 TABLET | Refills: 0 | Status: SHIPPED | OUTPATIENT
Start: 2022-07-08 | End: 2022-08-08

## 2022-07-08 NOTE — PROGRESS NOTES
"Established Patient     The patient location is: home   The chief complaint leading to consultation is: headache follow up visit    Visit type: audiovisual    Face to Face time with patient: 15 minutes  30 minutes of total time spent on the encounter, which includes face to face time and non-face to face time preparing to see the patient (eg, review of tests), Obtaining and/or reviewing separately obtained history, Documenting clinical information in the electronic or other health record, Independently interpreting results (not separately reported) and communicating results to the patient/family/caregiver, or Care coordination (not separately reported).     Each patient to whom he or she provides medical services by telemedicine is:  (1) informed of the relationship between the physician and patient and the respective role of any other health care provider with respect to management of the patient; and (2) notified that he or she may decline to receive medical services by telemedicine and may withdraw from such care at any time.  Notes:        SUBJECTIVE:  Patient ID: Patel Ghotra   Chief Complaint: Headache    History of Present Illness:  Patel Ghotra is a 26 y.o. male who presents via virtual visit alone for follow-up of headaches.   Patient reports he is still experiencing episodic  headahces but notes they are not as intense and do not last as long as they were before starting his current dose of sumatriptan 100 mg. He notes he has been tracking his headaches and describes his headaches occur "now and then"  and usually occur if he is dehydrated or hasnt slept very well. He notes he tries not to take the sumatriptan unless his headache becomes very severe.   He notes he has been attending physical therapy to address his cervicogenic headache component and notes this seems to help   He notes he has a headache right now in his temples bilaterally, current headache is similar to previous headaches " with no new symptoms or headache features.     Treatments Tried: fioricet, naproxen, tyelenol    Review of Systems - as per HPI, otherwise a balanced 10 systems review is negative.    OBJECTIVE:  Vitals:  There were no vitals taken for this visit.   Physical Exam:  Constitutional: he appears well-developed and well-nourished. he is well groomed. NAD   HENT:    Head: Normocephalic and atraumatic  Eyes: Conjunctivae and EOM are normal  Musculoskeletal: Normal range of motion.   Psychiatric: Mood and affect are normal    Neuro: Patient is alert and oriented to person, place, and time. Language is intact and fluent. Speech is clear and fluent. Recent and remote memory are intact.  Normal attention and concentration.  Facial movement is symmetric. Moves all 4 extremities against gravity.      Focused telemedicine examination was undertaken today with certain limitations due to video visit platform. Most of the face to face visit time was spent giving guidance, counseling and discussing treatment options.    Review of Data:   Office Visit on 06/15/2022   Component Date Value Ref Range Status    Color, UA 06/15/2022 Yellow   Final    pH, UA 06/15/2022 6   Final    WBC, UA 06/15/2022 NEGATIVE   Final    Nitrite, UA 06/15/2022 NEGATIVE   Final    Protein, POC 06/15/2022 NEGATIVE   Final    Glucose, UA 06/15/2022 NORMAL   Final    Ketones, UA 06/15/2022 NEGATIVE   Final    Urobilinogen, UA 06/15/2022 NORMAL   Final    Bilirubin, POC 06/15/2022 NEGATIVE   Final    Blood, UA 06/15/2022 NEGATIVE   Final    Clarity, UA 06/15/2022 Clear   Final    Spec Grav UA 06/15/2022 1.015   Final    POC Residual Urine Volume 06/15/2022 95  0 - 100 mL Final     Results for orders placed or performed during the hospital encounter of 03/21/22   MRI Brain Without Contrast    Narrative    EXAMINATION:  MRI BRAIN WITHOUT CONTRAST    CLINICAL HISTORY:  Headache, chronic, new features or increased frequency;Migraine with aura, not  intractable, without status migrainosus    TECHNIQUE:  Multiplanar multisequence MR imaging of the brain was performed without intravenous contrast.    COMPARISON:  No priors    FINDINGS:  Intracranial Compartment:    Ventricles are normal in size for age without evidence of hydrocephalus.    The brain parenchyma appears within normal limits.  No mass, hemorrhage, or recent or remote major vascular distribution infarct.    No extra-axial blood or fluid collections.    Normal vascular flow voids are preserved.    Skull/Extracranial Contents (limited evaluation):    Bone marrow signal intensity is normal.      Impression    No evidence of acute intracranial pathology.      Electronically signed by: Anirudh Vargas MD  Date:    03/22/2022  Time:    06:17       Note: I have independently reviewed any/all imaging/labs/tests and agree with the report (s) as documented.  Any discrepancies will be as noted/demarcated by free text. VINICIUS REYNA 7/8/2022    Assessment/Plan:     Problem List Items Addressed This Visit        Neuro    Migraine with aura and without status migrainosus, not intractable - Primary      Other Visit Diagnoses     Cervicogenic headache              Patel Owusu Brandin 26 y.o. male presents for followup.Discussed symptoms appear to be consistent with  migraine headaches with visual aura. And currently patient is experiencing frequent migraine headaches despite trial of abortive medication sumatriptan. Discussed treatment options and patient agreed with the following plan:  Preventative medication - trial of amitriptyline 25 mg QHS for preventive tx of migraine headaches. I discussed potential side effects of the medication. We will start at a lower dose and increase as tolerated.   Abortive medication - trial of maxalt 10 mg as needed for acute tx of migraine headaches, discontinue sumatriptan 100 mg.  Continue with physical therapy for tx of cervicogenic headache and neck pain.     Continue tracking  headaches, discussed migraine rico gretel for smart phone.     I will see the patient back for followup in 2 months for followup.     Discussed goals of therapy are to decrease the frequency, intensity, and duration of headaches  The patient verbalizes understanding and agreement with the treatment plan. I have discussed risks, benefits and alternatives to the treatment plan. Questions were sought and answered to the patients stated verbal satisfaction.        Chuy Johnson PA-C  [facility]  Department of Neurology     Collaborating Physician, Dr. Doan, was available during today's encounter.

## 2022-07-09 ENCOUNTER — LAB VISIT (OUTPATIENT)
Dept: LAB | Facility: HOSPITAL | Age: 27
End: 2022-07-09
Attending: INTERNAL MEDICINE
Payer: MEDICAID

## 2022-07-09 DIAGNOSIS — Z00.00 ANNUAL PHYSICAL EXAM: ICD-10-CM

## 2022-07-09 LAB
ALBUMIN SERPL BCP-MCNC: 4.8 G/DL (ref 3.5–5.2)
ALP SERPL-CCNC: 87 U/L (ref 55–135)
ALT SERPL W/O P-5'-P-CCNC: 26 U/L (ref 10–44)
ANION GAP SERPL CALC-SCNC: 13 MMOL/L (ref 8–16)
AST SERPL-CCNC: 22 U/L (ref 10–40)
BASOPHILS # BLD AUTO: 0.04 K/UL (ref 0–0.2)
BASOPHILS NFR BLD: 0.7 % (ref 0–1.9)
BILIRUB SERPL-MCNC: 0.9 MG/DL (ref 0.1–1)
BUN SERPL-MCNC: 14 MG/DL (ref 6–20)
CALCIUM SERPL-MCNC: 10.2 MG/DL (ref 8.7–10.5)
CHLORIDE SERPL-SCNC: 100 MMOL/L (ref 95–110)
CHOLEST SERPL-MCNC: 205 MG/DL (ref 120–199)
CHOLEST/HDLC SERPL: 3.4 {RATIO} (ref 2–5)
CO2 SERPL-SCNC: 26 MMOL/L (ref 23–29)
CREAT SERPL-MCNC: 1 MG/DL (ref 0.5–1.4)
DIFFERENTIAL METHOD: NORMAL
EOSINOPHIL # BLD AUTO: 0.2 K/UL (ref 0–0.5)
EOSINOPHIL NFR BLD: 3.7 % (ref 0–8)
ERYTHROCYTE [DISTWIDTH] IN BLOOD BY AUTOMATED COUNT: 11.9 % (ref 11.5–14.5)
EST. GFR  (AFRICAN AMERICAN): >60 ML/MIN/1.73 M^2
EST. GFR  (NON AFRICAN AMERICAN): >60 ML/MIN/1.73 M^2
GLUCOSE SERPL-MCNC: 101 MG/DL (ref 70–110)
HCT VFR BLD AUTO: 46.5 % (ref 40–54)
HDLC SERPL-MCNC: 61 MG/DL (ref 40–75)
HDLC SERPL: 29.8 % (ref 20–50)
HGB BLD-MCNC: 15.7 G/DL (ref 14–18)
IMM GRANULOCYTES # BLD AUTO: 0.01 K/UL (ref 0–0.04)
IMM GRANULOCYTES NFR BLD AUTO: 0.2 % (ref 0–0.5)
LDLC SERPL CALC-MCNC: 119.4 MG/DL (ref 63–159)
LYMPHOCYTES # BLD AUTO: 2.2 K/UL (ref 1–4.8)
LYMPHOCYTES NFR BLD: 39.7 % (ref 18–48)
MCH RBC QN AUTO: 30.6 PG (ref 27–31)
MCHC RBC AUTO-ENTMCNC: 33.8 G/DL (ref 32–36)
MCV RBC AUTO: 91 FL (ref 82–98)
MONOCYTES # BLD AUTO: 0.4 K/UL (ref 0.3–1)
MONOCYTES NFR BLD: 6.6 % (ref 4–15)
NEUTROPHILS # BLD AUTO: 2.7 K/UL (ref 1.8–7.7)
NEUTROPHILS NFR BLD: 49.1 % (ref 38–73)
NONHDLC SERPL-MCNC: 144 MG/DL
NRBC BLD-RTO: 0 /100 WBC
PLATELET # BLD AUTO: 278 K/UL (ref 150–450)
PMV BLD AUTO: 10.1 FL (ref 9.2–12.9)
POTASSIUM SERPL-SCNC: 4.3 MMOL/L (ref 3.5–5.1)
PROT SERPL-MCNC: 8.3 G/DL (ref 6–8.4)
RBC # BLD AUTO: 5.13 M/UL (ref 4.6–6.2)
SODIUM SERPL-SCNC: 139 MMOL/L (ref 136–145)
TRIGL SERPL-MCNC: 123 MG/DL (ref 30–150)
TSH SERPL DL<=0.005 MIU/L-ACNC: 1 UIU/ML (ref 0.4–4)
WBC # BLD AUTO: 5.42 K/UL (ref 3.9–12.7)

## 2022-07-09 PROCEDURE — 36415 COLL VENOUS BLD VENIPUNCTURE: CPT | Mod: PO | Performed by: INTERNAL MEDICINE

## 2022-07-09 PROCEDURE — 85025 COMPLETE CBC W/AUTO DIFF WBC: CPT | Performed by: INTERNAL MEDICINE

## 2022-07-09 PROCEDURE — 80053 COMPREHEN METABOLIC PANEL: CPT | Performed by: INTERNAL MEDICINE

## 2022-07-09 PROCEDURE — 80061 LIPID PANEL: CPT | Performed by: INTERNAL MEDICINE

## 2022-07-09 PROCEDURE — 84443 ASSAY THYROID STIM HORMONE: CPT | Performed by: INTERNAL MEDICINE

## 2022-07-12 ENCOUNTER — CLINICAL SUPPORT (OUTPATIENT)
Dept: REHABILITATION | Facility: HOSPITAL | Age: 27
End: 2022-07-12
Payer: MEDICAID

## 2022-07-12 DIAGNOSIS — M54.2 NECK PAIN ON LEFT SIDE: ICD-10-CM

## 2022-07-12 DIAGNOSIS — R29.3 POOR POSTURE: Primary | ICD-10-CM

## 2022-07-12 PROCEDURE — 97110 THERAPEUTIC EXERCISES: CPT | Mod: PO

## 2022-07-12 NOTE — PROGRESS NOTES
Physical Therapy Treatment Note     Name: Patel Owusu [facility] Number: [id number]    Therapy Diagnosis:   Encounter Diagnoses   Name Primary?    Poor posture Yes    Neck pain on left side      Physician: Chuy Johnson PA-C    Visit Date: 7/12/2022    Physician Orders: PT Eval and Treat   Medical Diagnosis from Referral: Migraine with aura and without status migrainosus, not intractable [G43.109], Cervicogenic headache [G44.86]     Evaluation Date: 6/21/2022  Authorization Period Expiration: 5/2/23  Plan of Care Expiration: 9/21/22  Visit # / Visits authorized: 3/10  FOTO: 0/3 not given at eval  Precautions: Standard      Time In: 12:30  Time Out: 13:15  Total Billable Time: 45 minutes    Precautions: Standard    Subjective     Pt reports: that he dose not have a head ache today.    He was compliant with home exercise program.  Response to previous treatment: WNL, IE completed  Functional change: none mentioned    Pain: 4/10  Location: B sides of neck      Objective   Bolded activities performed today      Cesario received manual therapy x 00 min including manual traction, soft tissue mobilization and dry needling.  Manual Cx spine traction  Soft tissue mobilization to the Cx paraspinals and B upper traps  Passive mobilization to the Cx and thoracic spine  Dry needling to:  B suboccipitals  B C1-2  B upper traps        Cesario received therapeutic exercises to develop strength, endurance, ROM and flexibility for 45 minutes including:  HEP reviewed as needed: levator scap stretch, open books  FR routine: cane overhead stretch 10x10  B shoulder ER w/BTB x20  +freemotion rows single arm 7lb x10, second set 3lb x10  UBE L1 x4mins fwd /bkwd  +Wall angels 2x10  +orange SB roll up wall 10x10  Discussed with Pt how his forward head/rounded shoulder posture may be a cause of his head aches.  Pt was instructed in and performed  Supine suboccipital stretch 10 sec x 5 with manual cues for positioning  Scapular  "retractions  Chin retractions  Instruction in hip hinge with a dowel against his back   Supine over a 6" foam roller x 5 min  Standing rows 10 x 3 with orange TB with verbal cues for posture  Standing B shoulder extension 10 x 3 with orange TB with verbal cues for posture       Pt reviewed and signed an informed release for Dry needing on 7/1/2022      Home Exercises Provided and Patient Education Provided     Education provided:   - posture and it's possible relationship to his headaches   - standing row and standing B shoulder extension and orange TB for HEP.    Written Home Exercises Provided: Patient instructed to cont prior HEP.  Exercises were reviewed and Cesario was able to demonstrate them prior to the end of the session.  Cesario demonstrated good  understanding of the education provided.     See EMR under Patient Instructions for exercises provided prior visit.    Assessment     Pt appears to understand how his posture may affect his head aches.  He tolerated all Tx activities well today.  Pt with weakness in his spinal stabilizer muscles in the Cx, thoracic and lumbar spine.     Cesario Is progressing well towards his goals.   Pt prognosis is Excellent.     Pt will continue to benefit from skilled outpatient physical therapy to address the deficits listed in the problem list box on initial evaluation, provide pt/family education and to maximize pt's level of independence in the home and community environment.     Pt's spiritual, cultural and educational needs considered and pt agreeable to plan of care and goals.     Anticipated barriers to physical therapy: none    Goals:  Short Term Goals (4 Weeks):   1. Pt will be compliant with HEP to assist in progressing PT POC and restoring LOF.   2. Pt will report decrease in neck pain to <4/10 average.  3. Pt will improve Cervical Rotation by >10% as compared to IE in order to improve driving  4. Pt will display good upright posture at rest w/o requiring cueing.     Long " Term Goals (12 Weeks):   1. Pt will report improved completion of ADLs w/less pain (sleeping, carrying, driving, shopping, self care, working)  2. Pt will display improved Cervical Flexion, sidebending and ext to >75% as compared to IE in order to improve functional mobility of cervical spine.  3. Pt will be able to demonstrate upright posture for full tx session without verbal cuing in order to improve work ergonomics and posture.         Plan   Plan of care Certification: 6/21/2022 to 9/21/22.     Outpatient Physical Therapy 0-3 times weekly for up to 12 weeks including the following interventions: Aquatic Therapy, Manual Therapy, Moist Heat/ Ice, Neuromuscular Re-ed, Patient Education, Self Care, Therapeutic Activities and Therapeutic Exercise.          Pedro Cook, PT

## 2022-07-14 ENCOUNTER — CLINICAL SUPPORT (OUTPATIENT)
Dept: REHABILITATION | Facility: HOSPITAL | Age: 27
End: 2022-07-14
Payer: MEDICAID

## 2022-07-14 DIAGNOSIS — R29.3 POOR POSTURE: Primary | ICD-10-CM

## 2022-07-14 DIAGNOSIS — M54.2 NECK PAIN ON LEFT SIDE: ICD-10-CM

## 2022-07-14 PROCEDURE — 97110 THERAPEUTIC EXERCISES: CPT | Mod: PO

## 2022-07-14 NOTE — PROGRESS NOTES
"  Physical Therapy Treatment Note     Name: Patel Owusu [facility] Number: [id number]    Therapy Diagnosis:   Encounter Diagnoses   Name Primary?    Poor posture Yes    Neck pain on left side      Physician: Chuy Johnson PA-C    Visit Date: 7/14/2022    Physician Orders: PT Eval and Treat   Medical Diagnosis from Referral: Migraine with aura and without status migrainosus, not intractable [G43.109], Cervicogenic headache [G44.86]     Evaluation Date: 6/21/2022  Authorization Period Expiration: 5/2/23  Plan of Care Expiration: 9/21/22  Visit # / Visits authorized: 4/10  FOTO: 0/3 not given at eval  Precautions: Standard      Time In: 0800  Time Out: 0845  Total Billable Time: 45 minutes    Precautions: Standard    Subjective     Pt reports: Usually hes sore the night after treatment and has a lot of trouble sleeping, this was true for Tuesday evening.    "The stretches seem to be helping." says he started a new medicine last night that is an antidepressant but is supposed to help with migraines, so it knocked him out   I already do upper body workouts so this doesnt really feel like much.      He was compliant with home exercise program.  Response to previous treatment: was much more sore that day and the night following last appt.   Functional change: none mentioned    Pain: 4/10   Location: B sides of neck      Objective   Pt with significant scapular winging w/all attempted activities. Worked on various strategies to to reduce this and improve pt's control over scapular stabilization.     Cesario received manual therapy x 00 min including manual traction, soft tissue mobilization and dry needling.  Manual Cx spine traction  Soft tissue mobilization to the Cx paraspinals and B upper traps  Passive mobilization to the Cx and thoracic spine  Dry needling to:  B suboccipitals  B C1-2  B upper traps        Cesario received therapeutic exercises to develop strength, endurance, ROM and flexibility for 45 minutes " "including:    Supine over a 6" foam roller x 5 min  +serratus punches 3x10, unable to complete w/weights added, NV trial using 3lb dowel  Wall angels w/lift off x20  +quadruped Ts 2x10  +thread the needle in quadruped 5x10 ea side  +prone rows w/emphasis on scap retraction, no weight 2x10  Instruction in hip hinge with a dowel against his back   Standing rows 10 x 3 at freemotion 10lb w/2 handles on 1 side of machine    NP- Supine suboccipital stretch 10 sec x 5 with manual cues for positioning  NP- Chin retractions 5 x20  NP- Standing B shoulder extension 10 x 3 with orange TB with verbal cues for posture         Home Exercises Provided and Patient Education Provided     Education provided:   - posture and it's possible relationship to his headaches   - standing row and standing B shoulder extension and orange TB for HEP.    Written Home Exercises Provided: Patient instructed to cont prior HEP.  Exercises were reviewed and Cesario was able to demonstrate them prior to the end of the session.  Cesario demonstrated good  understanding of the education provided.     See EMR under Patient Instructions for exercises provided prior visit.    Assessment   pt with significant difficulty performing some exercises this date. Requires a lot of cueing and practice to avoid upper trap compensation.        Cesario Is progressing well towards his goals.   Pt prognosis is Excellent.     Pt will continue to benefit from skilled outpatient physical therapy to address the deficits listed in the problem list box on initial evaluation, provide pt/family education and to maximize pt's level of independence in the home and community environment.     Pt's spiritual, cultural and educational needs considered and pt agreeable to plan of care and goals.     Anticipated barriers to physical therapy: none    Goals:  Short Term Goals (4 Weeks):   1. Pt will be compliant with HEP to assist in progressing PT POC and restoring LOF.   2. Pt will report " decrease in neck pain to <4/10 average.  3. Pt will improve Cervical Rotation by >10% as compared to IE in order to improve driving  4. Pt will display good upright posture at rest w/o requiring cueing.     Long Term Goals (12 Weeks):   1. Pt will report improved completion of ADLs w/less pain (sleeping, carrying, driving, shopping, self care, working)  2. Pt will display improved Cervical Flexion, sidebending and ext to >75% as compared to IE in order to improve functional mobility of cervical spine.  3. Pt will be able to demonstrate upright posture for full tx session without verbal cuing in order to improve work ergonomics and posture.         Plan   Plan of care Certification: 6/21/2022 to 9/21/22.     Outpatient Physical Therapy 0-3 times weekly for up to 12 weeks including the following interventions: Aquatic Therapy, Manual Therapy, Moist Heat/ Ice, Neuromuscular Re-ed, Patient Education, Self Care, Therapeutic Activities and Therapeutic Exercise.          Nell Mata, PT

## 2022-07-18 ENCOUNTER — CLINICAL SUPPORT (OUTPATIENT)
Dept: REHABILITATION | Facility: HOSPITAL | Age: 27
End: 2022-07-18
Payer: MEDICAID

## 2022-07-18 DIAGNOSIS — R29.3 POOR POSTURE: Primary | ICD-10-CM

## 2022-07-18 DIAGNOSIS — M54.2 NECK PAIN ON LEFT SIDE: ICD-10-CM

## 2022-07-18 PROCEDURE — 97110 THERAPEUTIC EXERCISES: CPT | Mod: PO

## 2022-07-18 NOTE — PROGRESS NOTES
"  Physical Therapy Treatment Note     Name: Patel Owusu [facility] Number: [id number]    Therapy Diagnosis:   Encounter Diagnoses   Name Primary?    Poor posture Yes    Neck pain on left side      Physician: Chuy Johnson PA-C    Visit Date: 7/18/2022    Physician Orders: PT Eval and Treat   Medical Diagnosis from Referral: Migraine with aura and without status migrainosus, not intractable [G43.109], Cervicogenic headache [G44.86]     Evaluation Date: 6/21/2022  Authorization Period Expiration: 5/2/23  Plan of Care Expiration: 9/21/22  Visit # / Visits authorized: 5/10  FOTO: 0/3 not given at eval  Precautions: Standard      Time In: 1315  Time Out: 1400  Total Billable Time: 40 minutes    Precautions: Standard    Subjective     Pt reports: he isnt able to tell if the medication is really helping relieve his migraines or not. Says the doctor said it may take a couple months to have effect.     He was compliant with home exercise program.  Response to previous treatment: was much more sore that day and the night following last appt.   Functional change: none mentioned    Pain: 4/10   Location: B sides of neck      Objective   Pt with significant scapular winging w/all attempted activities. Worked on various strategies to to reduce this and improve pt's control over scapular stabilization.     Cesario received manual therapy x 00 min including manual traction, soft tissue mobilization and dry needling.  Manual Cx spine traction  Soft tissue mobilization to the Cx paraspinals and B upper traps  Passive mobilization to the Cx and thoracic spine  Dry needling to:  B suboccipitals  B C1-2  B upper traps        Cesario received therapeutic exercises to develop strength, endurance, ROM and flexibility for 40 minutes including:  +serratus slides on wall, vertical 3x10  Instruction in hip hinge with a dowel against his back   Standing rows 10 x 3 at freemotion 10lb w/2 handles on 1 side of machine  Supine over a 6" foam " roller x 5 min  serratus punches 3x10, able to use 3lb dowel today  Wall angels w/lift off x20  quadruped Ts 2x10  thread the needle in quadruped 5x5 ea side  prone rows w/emphasis on scap retraction, no weight 3x10    NP- Supine suboccipital stretch 10 sec x 5 with manual cues for positioning  NP- Chin retractions 5 x20  NP- Standing B shoulder extension 10 x 3 with orange TB with verbal cues for posture     Home Exercises Provided and Patient Education Provided     Education provided:   - posture and it's possible relationship to his headaches   - standing row and standing B shoulder extension and orange TB for HEP.    Written Home Exercises Provided: Patient instructed to cont prior HEP.  Exercises were reviewed and Cesario was able to demonstrate them prior to the end of the session.  Cesario demonstrated good  understanding of the education provided.     See EMR under Patient Instructions for exercises provided prior visit.    Assessment   Pt with good carryover from last session to today. Denied any increase in neck pain or headaches throughout session.        Cesario Is progressing well towards his goals.   Pt prognosis is Excellent.     Pt will continue to benefit from skilled outpatient physical therapy to address the deficits listed in the problem list box on initial evaluation, provide pt/family education and to maximize pt's level of independence in the home and community environment.     Pt's spiritual, cultural and educational needs considered and pt agreeable to plan of care and goals.     Anticipated barriers to physical therapy: none    Goals:  Short Term Goals (4 Weeks):   1. Pt will be compliant with HEP to assist in progressing PT POC and restoring LOF.   2. Pt will report decrease in neck pain to <4/10 average.  3. Pt will improve Cervical Rotation by >10% as compared to IE in order to improve driving  4. Pt will display good upright posture at rest w/o requiring cueing.     Long Term Goals (12  Weeks):   1. Pt will report improved completion of ADLs w/less pain (sleeping, carrying, driving, shopping, self care, working)  2. Pt will display improved Cervical Flexion, sidebending and ext to >75% as compared to IE in order to improve functional mobility of cervical spine.  3. Pt will be able to demonstrate upright posture for full tx session without verbal cuing in order to improve work ergonomics and posture.         Plan   Plan of care Certification: 6/21/2022 to 9/21/22.     Outpatient Physical Therapy 0-3 times weekly for up to 12 weeks including the following interventions: Aquatic Therapy, Manual Therapy, Moist Heat/ Ice, Neuromuscular Re-ed, Patient Education, Self Care, Therapeutic Activities and Therapeutic Exercise.          Nell Mata, PT

## 2022-07-20 ENCOUNTER — CLINICAL SUPPORT (OUTPATIENT)
Dept: REHABILITATION | Facility: HOSPITAL | Age: 27
End: 2022-07-20
Payer: MEDICAID

## 2022-07-20 DIAGNOSIS — M54.2 NECK PAIN ON LEFT SIDE: ICD-10-CM

## 2022-07-20 DIAGNOSIS — R29.3 POOR POSTURE: Primary | ICD-10-CM

## 2022-07-20 PROCEDURE — 97110 THERAPEUTIC EXERCISES: CPT | Mod: PO

## 2022-07-20 NOTE — PROGRESS NOTES
"  Physical Therapy Treatment Note     Name: Patel Owusu [facility] Number: [id number]    Therapy Diagnosis:   Encounter Diagnoses   Name Primary?    Poor posture Yes    Neck pain on left side      Physician: Chuy Johnson PA-C    Visit Date: 7/20/2022    Physician Orders: PT Eval and Treat   Medical Diagnosis from Referral: Migraine with aura and without status migrainosus, not intractable [G43.109], Cervicogenic headache [G44.86]     Evaluation Date: 6/21/2022  Authorization Period Expiration: 5/2/23  Plan of Care Expiration: 9/21/22  Visit # / Visits authorized: 6/10  FOTO: 0/3 not given at eval  Precautions: Standard      Time In: 1530  Time Out: 1415  Total Billable Time: 45 minutes    Precautions: Standard    Subjective     Pt reports: hes having a little trouble getting his head and neck comfortable to sleep.     He was compliant with home exercise program.  Response to previous treatment: was much more sore that day and the night following last appt.   Functional change: none mentioned    Pain: 2/10   Location: B sides of neck      Objective     Cesario received manual therapy x 00 min including manual traction, soft tissue mobilization and dry needling.  Manual Cx spine traction  Soft tissue mobilization to the Cx paraspinals and B upper traps  Passive mobilization to the Cx and thoracic spine  Dry needling to:  B suboccipitals  B C1-2  B upper traps        Cesario received therapeutic exercises to develop strength, endurance, ROM and flexibility for 40 minutes including:  UBE 3mins fwd/ 3 mins bkwds  +push up plus against the wall rather than in plank position (this was too difficult) x10  NP-serratus slides on wall, vertical 3x10  NP- Standing rows 10 x 3 at freemotion 10lb w/2 handles on 1 side of machine  Supine over a 6" foam roller x 2 min  serratus punches 2x10, using 3lb dowel   Wall angels w/lift off x20  quadruped Ts 2x10 +added 2lb weight  NP- thread the needle in quadruped 5x5 ea " side  prone rows w/emphasis on scap retraction, holding 5lb weight 3x10    Home Exercises Provided and Patient Education Provided     Education provided:   Discussed Sleep hygiene, using a pillow or rolled up towel for neck support      Written Home Exercises Provided: Patient instructed to cont prior HEP.  Exercises were reviewed and Cesario was able to demonstrate them prior to the end of the session.  Cesario demonstrated good  understanding of the education provided.     See EMR under Patient Instructions for exercises provided prior visit.    Assessment   Pt with improved scapular stability during exercises completed today. Able to advance in resistance with prone exercises also.        Cesario Is progressing well towards his goals.   Pt prognosis is Excellent.     Pt will continue to benefit from skilled outpatient physical therapy to address the deficits listed in the problem list box on initial evaluation, provide pt/family education and to maximize pt's level of independence in the home and community environment.     Pt's spiritual, cultural and educational needs considered and pt agreeable to plan of care and goals.     Anticipated barriers to physical therapy: none    Goals:  Short Term Goals (4 Weeks):   1. Pt will be compliant with HEP to assist in progressing PT POC and restoring LOF.   2. Pt will report decrease in neck pain to <4/10 average.  3. Pt will improve Cervical Rotation by >10% as compared to IE in order to improve driving  4. Pt will display good upright posture at rest w/o requiring cueing.     Long Term Goals (12 Weeks):   1. Pt will report improved completion of ADLs w/less pain (sleeping, carrying, driving, shopping, self care, working)  2. Pt will display improved Cervical Flexion, sidebending and ext to >75% as compared to IE in order to improve functional mobility of cervical spine.  3. Pt will be able to demonstrate upright posture for full tx session without verbal cuing in order to improve  work ergonomics and posture.         Plan   Plan of care Certification: 6/21/2022 to 9/21/22.     Outpatient Physical Therapy 0-3 times weekly for up to 12 weeks including the following interventions: Aquatic Therapy, Manual Therapy, Moist Heat/ Ice, Neuromuscular Re-ed, Patient Education, Self Care, Therapeutic Activities and Therapeutic Exercise.          Nell Mata, PT

## 2022-07-27 ENCOUNTER — CLINICAL SUPPORT (OUTPATIENT)
Dept: REHABILITATION | Facility: HOSPITAL | Age: 27
End: 2022-07-27
Payer: MEDICAID

## 2022-07-27 DIAGNOSIS — R29.3 POOR POSTURE: Primary | ICD-10-CM

## 2022-07-27 DIAGNOSIS — M54.2 NECK PAIN ON LEFT SIDE: ICD-10-CM

## 2022-07-27 PROCEDURE — 97110 THERAPEUTIC EXERCISES: CPT | Mod: PO

## 2022-07-27 NOTE — PROGRESS NOTES
"  Physical Therapy Treatment Note     Name: Patel Owusu [facility] Number: [id number]    Therapy Diagnosis:   Encounter Diagnoses   Name Primary?    Poor posture Yes    Neck pain on left side      Physician: Chuy Johnson PA-C    Visit Date: 7/27/2022    Physician Orders: PT Eval and Treat   Medical Diagnosis from Referral: Migraine with aura and without status migrainosus, not intractable [G43.109], Cervicogenic headache [G44.86]     Evaluation Date: 6/21/2022  Authorization Period Expiration: 5/2/23  Plan of Care Expiration: 9/21/22  Visit # / Visits authorized: 7/10  FOTO: 0/3 not given at eval  Precautions: Standard      Time In: 1530  Time Out: 1615  Total Billable Time: 45 minutes    Precautions: Standard    Subjective     Pt reports: he's feeling ok today.     He was compliant with home exercise program.  Response to previous treatment: WNL  Functional change: none mentioned    Pain: 2/10   Location: B sides of neck when turning his head    Objective     Cesario received manual therapy x 00 min including manual traction, soft tissue mobilization and dry needling.  Manual Cx spine traction  Soft tissue mobilization to the Cx paraspinals and B upper traps  Passive mobilization to the Cx and thoracic spine  Dry needling to:  B suboccipitals  B C1-2  B upper traps        Cesario received therapeutic exercises to develop strength, endurance, ROM and flexibility for 45 minutes including:  UBE 3mins fwd/ 3 mins bkwds L2  push up plus against the wall rather than in plank position (this was too difficult) x10  quadruped Ts 2x10 using 2lb weight  serratus slides on wall, vertical 2x10  NP- Standing rows 10 x 3 at freemotion 10lb w/2 handles on 1 side of machine  Supine over a 6" foam roller x 2 min  Wall angels w/lift off x20  thread the needle in quadruped 5x5 ea side  prone rows w/emphasis on scap retraction, holding 6lb weight 3x10      Home Exercises Provided and Patient Education Provided     Education " provided:   Discussed Sleep hygiene, using a pillow or rolled up towel for neck support      Written Home Exercises Provided: Patient instructed to cont prior HEP.  Exercises were reviewed and Cesario was able to demonstrate them prior to the end of the session.  Cesario demonstrated good  understanding of the education provided.     See EMR under Patient Instructions for exercises provided prior visit.    Assessment   Pt continues to show improved scapular control and strengthening capacity during PT sessions.        Cesario Is progressing well towards his goals.   Pt prognosis is Excellent.     Pt will continue to benefit from skilled outpatient physical therapy to address the deficits listed in the problem list box on initial evaluation, provide pt/family education and to maximize pt's level of independence in the home and community environment.     Pt's spiritual, cultural and educational needs considered and pt agreeable to plan of care and goals.     Anticipated barriers to physical therapy: none    Goals:  Short Term Goals (4 Weeks):   1. Pt will be compliant with HEP to assist in progressing PT POC and restoring LOF.   2. Pt will report decrease in neck pain to <4/10 average.  3. Pt will improve Cervical Rotation by >10% as compared to IE in order to improve driving  4. Pt will display good upright posture at rest w/o requiring cueing.     Long Term Goals (12 Weeks):   1. Pt will report improved completion of ADLs w/less pain (sleeping, carrying, driving, shopping, self care, working)  2. Pt will display improved Cervical Flexion, sidebending and ext to >75% as compared to IE in order to improve functional mobility of cervical spine.  3. Pt will be able to demonstrate upright posture for full tx session without verbal cuing in order to improve work ergonomics and posture.         Plan   Plan of care Certification: 6/21/2022 to 9/21/22.     Outpatient Physical Therapy 0-3 times weekly for up to 12 weeks including the  following interventions: Aquatic Therapy, Manual Therapy, Moist Heat/ Ice, Neuromuscular Re-ed, Patient Education, Self Care, Therapeutic Activities and Therapeutic Exercise.          Nell Mata, PT

## 2022-07-29 ENCOUNTER — CLINICAL SUPPORT (OUTPATIENT)
Dept: REHABILITATION | Facility: HOSPITAL | Age: 27
End: 2022-07-29
Payer: MEDICAID

## 2022-07-29 DIAGNOSIS — R29.3 POOR POSTURE: Primary | ICD-10-CM

## 2022-07-29 DIAGNOSIS — M54.2 NECK PAIN ON LEFT SIDE: ICD-10-CM

## 2022-07-29 PROCEDURE — 97110 THERAPEUTIC EXERCISES: CPT | Mod: PO

## 2022-07-29 NOTE — PROGRESS NOTES
"  Physical Therapy Treatment Note     Name: Patel Owusu [facility] Number: [id number]    Therapy Diagnosis:   Encounter Diagnoses   Name Primary?    Poor posture Yes    Neck pain on left side      Physician: Chuy Johnson PA-C    Visit Date: 7/29/2022    Physician Orders: PT Eval and Treat   Medical Diagnosis from Referral: Migraine with aura and without status migrainosus, not intractable [G43.109], Cervicogenic headache [G44.86]     Evaluation Date: 6/21/2022  Authorization Period Expiration: 5/2/23  Plan of Care Expiration: 9/21/22  Visit # / Visits authorized: 8/10  FOTO: 0/3 not given at eval  Precautions: Standard      Time In: 1:05  Time Out: 1:45  Total Billable Time: 45 minutes    Precautions: Standard    Subjective     Pt reports: a R temporal headache.  He stated that it may be from not sleeping well last night.    He was compliant with home exercise program.  Response to previous treatment: WNL  Functional change: none mentioned    Pain: 2/10   Location: R temporal headache    Objective     Cesario received manual therapy x 15 min including manual traction, soft tissue mobilization and dry needling.  Manual Cx spine traction  Soft tissue mobilization to the Cx paraspinals and B upper traps  Passive mobilization to the Cx and thoracic spine  Dry needling to:  B suboccipitals  B C1-2  B upper traps        Cesario received therapeutic exercises to develop strength, endurance, ROM and flexibility for 30 minutes including:  UBE 3mins fwd/ 3 mins bkwds L2  push up plus against the wall rather than in plank position (this was too difficult) x10  quadruped Ts 2x10 using 2lb weight  serratus slides on wall, vertical 2x10  NP- Standing rows 10 x 3 at freemotion 10lb w/2 handles on 1 side of machine  Supine over a 6" foam roller x 2 min  Wall angels w/lift off x20  thread the needle in quadruped 5x5 ea side  prone rows w/emphasis on scap retraction, holding 6lb weight 3x10      Home Exercises Provided and " Patient Education Provided     Education provided:   Discussed Sleep hygiene, using a pillow or rolled up towel for neck support      Written Home Exercises Provided: Patient instructed to cont prior HEP.  Exercises were reviewed and Cesario was able to demonstrate them prior to the end of the session.  Cesario demonstrated good  understanding of the education provided.     See EMR under Patient Instructions for exercises provided prior visit.    Assessment   Pt with reported mild R temporal headache today.  He stated that he believes it may be secondary to not sleeping well last night.  Pt tolerated all Tx activites well today.       Cesario Is progressing well towards his goals.   Pt prognosis is Excellent.     Pt will continue to benefit from skilled outpatient physical therapy to address the deficits listed in the problem list box on initial evaluation, provide pt/family education and to maximize pt's level of independence in the home and community environment.     Pt's spiritual, cultural and educational needs considered and pt agreeable to plan of care and goals.     Anticipated barriers to physical therapy: none    Goals:  Short Term Goals (4 Weeks):   1. Pt will be compliant with HEP to assist in progressing PT POC and restoring LOF.   2. Pt will report decrease in neck pain to <4/10 average.  3. Pt will improve Cervical Rotation by >10% as compared to IE in order to improve driving  4. Pt will display good upright posture at rest w/o requiring cueing.     Long Term Goals (12 Weeks):   1. Pt will report improved completion of ADLs w/less pain (sleeping, carrying, driving, shopping, self care, working)  2. Pt will display improved Cervical Flexion, sidebending and ext to >75% as compared to IE in order to improve functional mobility of cervical spine.  3. Pt will be able to demonstrate upright posture for full tx session without verbal cuing in order to improve work ergonomics and posture.         Plan   Plan of care  Certification: 6/21/2022 to 9/21/22.     Outpatient Physical Therapy 0-3 times weekly for up to 12 weeks including the following interventions: Aquatic Therapy, Manual Therapy, Moist Heat/ Ice, Neuromuscular Re-ed, Patient Education, Self Care, Therapeutic Activities and Therapeutic Exercise.          Pedro Cook, PT

## 2022-08-03 ENCOUNTER — CLINICAL SUPPORT (OUTPATIENT)
Dept: REHABILITATION | Facility: HOSPITAL | Age: 27
End: 2022-08-03
Payer: MEDICAID

## 2022-08-03 DIAGNOSIS — M54.2 NECK PAIN ON LEFT SIDE: ICD-10-CM

## 2022-08-03 DIAGNOSIS — R29.3 POOR POSTURE: Primary | ICD-10-CM

## 2022-08-03 PROCEDURE — 97110 THERAPEUTIC EXERCISES: CPT | Mod: PO

## 2022-08-03 NOTE — PROGRESS NOTES
"  Physical Therapy Treatment Note     Name: Patel Owusu [facility] Number: [id number]    Therapy Diagnosis:   Encounter Diagnoses   Name Primary?    Poor posture Yes    Neck pain on left side      Physician: Chuy Johnson PA-C    Visit Date: 8/3/2022    Physician Orders: PT Eval and Treat   Medical Diagnosis from Referral: Migraine with aura and without status migrainosus, not intractable [G43.109], Cervicogenic headache [G44.86]     Evaluation Date: 6/21/2022  Authorization Period Expiration: 5/2/23  Plan of Care Expiration: 9/21/22  Visit # / Visits authorized: 9/10  FOTO: 0/3 not given at eval  Precautions: Standard      Time In: 3:30  Time Out: 4:43  Total Billable Time: 43 minutes    Precautions: Standard    Subjective     Pt reports: that he is feeling pretty good today without a headache    He was compliant with home exercise program.  Response to previous treatment: WNL  Functional change: none mentioned    Pain: 2/10   Location: R temporal headache    Objective     Cesario received manual therapy x 05 min including manual traction, soft tissue mobilization and dry needling.  Manual Cx spine traction  Soft tissue mobilization to the Cx paraspinals and B upper traps  Passive mobilization to the thoracic spine and rib cage.  Soft tissue mobilization to the thoracic paraspinals  Dry needling to:  B suboccipitals  B C1-2  B upper traps        Cesario received therapeutic exercises to develop strength, endurance, ROM and flexibility for 40 minutes including:  UBE 3mins fwd/ 3 mins bkwds L2.5  push up plus against the wall rather than in plank position (this was too difficult) x10  quadruped Ts 2x10 using 2lb weight  serratus slides on wall, vertical x10 with foam roller 2 x 10  NP- Standing rows 10 x 3 at freemotion 10lb w/2 handles on 1 side of machine  Supine over a 6" foam roller x 2 min  Wall angels w/lift off x20  thread the needle in quadruped 5x5 ea side  Prone rows w/emphasis on scap retraction, " holding 6lb weight 3x10  Prone I's 10 x 2 with 2# on each  Prone T's 10 x 2 with 1# on each  Open book x 10 with 5 sec hold    Home Exercises Provided and Patient Education Provided     Education provided:   Discussed Sleep hygiene, using a pillow or rolled up towel for neck support      Written Home Exercises Provided: Patient instructed to cont prior HEP.  Exercises were reviewed and Cesario was able to demonstrate them prior to the end of the session.  Cesario demonstrated good  understanding of the education provided.     See EMR under Patient Instructions for exercises provided prior visit.    Assessment   Pt with no headache today.  Pt tolerated all Tx activites well today.  His thoracic spine and rib cage mobility have improved.  He remains weak in his intrascapular musculature.       Cesario Is progressing well towards his goals.   Pt prognosis is Excellent.     Pt will continue to benefit from skilled outpatient physical therapy to address the deficits listed in the problem list box on initial evaluation, provide pt/family education and to maximize pt's level of independence in the home and community environment.     Pt's spiritual, cultural and educational needs considered and pt agreeable to plan of care and goals.     Anticipated barriers to physical therapy: none    Goals:  Short Term Goals (4 Weeks):   1. Pt will be compliant with HEP to assist in progressing PT POC and restoring LOF.   2. Pt will report decrease in neck pain to <4/10 average.  3. Pt will improve Cervical Rotation by >10% as compared to IE in order to improve driving  4. Pt will display good upright posture at rest w/o requiring cueing.     Long Term Goals (12 Weeks):   1. Pt will report improved completion of ADLs w/less pain (sleeping, carrying, driving, shopping, self care, working)  2. Pt will display improved Cervical Flexion, sidebending and ext to >75% as compared to IE in order to improve functional mobility of cervical spine.  3. Pt  will be able to demonstrate upright posture for full tx session without verbal cuing in order to improve work ergonomics and posture.         Plan   Plan of care Certification: 6/21/2022 to 9/21/22.     Outpatient Physical Therapy 0-3 times weekly for up to 12 weeks including the following interventions: Aquatic Therapy, Manual Therapy, Moist Heat/ Ice, Neuromuscular Re-ed, Patient Education, Self Care, Therapeutic Activities and Therapeutic Exercise.          Pedro Cook, PT

## 2022-08-10 ENCOUNTER — CLINICAL SUPPORT (OUTPATIENT)
Dept: REHABILITATION | Facility: HOSPITAL | Age: 27
End: 2022-08-10
Payer: MEDICAID

## 2022-08-10 DIAGNOSIS — M54.2 NECK PAIN ON LEFT SIDE: ICD-10-CM

## 2022-08-10 DIAGNOSIS — R29.3 POOR POSTURE: Primary | ICD-10-CM

## 2022-08-10 PROCEDURE — 97110 THERAPEUTIC EXERCISES: CPT | Mod: PO

## 2022-08-10 NOTE — PROGRESS NOTES
"  Physical Therapy Treatment Note     Name: Patel Owusu [facility] Number: [id number]    Therapy Diagnosis:   Encounter Diagnoses   Name Primary?    Poor posture Yes    Neck pain on left side      Physician: Chuy Johnson PA-C    Visit Date: 8/10/2022    Physician Orders: PT Eval and Treat   Medical Diagnosis from Referral: Migraine with aura and without status migrainosus, not intractable [G43.109], Cervicogenic headache [G44.86]     Evaluation Date: 6/21/2022  Authorization Period Expiration: 5/2/23  Plan of Care Expiration: 9/21/22  Visit # / Visits authorized: 10/10  FOTO: 0/3 not given at eval  Precautions: Standard      Time In: 4:35  Time Out: 5:10  Total Billable Time: 35 minutes    Precautions: Standard    Subjective     Pt reports: that he pulled his back out cutting the grass yesterday.  He reports that he is sore on the R side of his rib cage.    He was compliant with home exercise program.  Response to previous treatment: WNL  Functional change: none mentioned    Pain: 2/10   Location: R flank    Objective     Cesario received manual therapy x 20 min including manual traction, soft tissue mobilization and dry needling.  Manual Cx spine traction  Soft tissue mobilization to the Cx paraspinals and B upper traps  Passive mobilization to the thoracic spine and rib cage.  Soft tissue mobilization to the thoracic paraspinals  Dry needling to:  B suboccipitals  B C1-2  B upper traps        Cesario received therapeutic exercises to develop strength, endurance, ROM and flexibility for 15 minutes including:  UBE 3mins fwd/ 3 mins bkwds L 1  push up plus against the wall rather than in plank position (this was too difficult) x10  quadruped Ts 2x10 using 2lb weight  serratus slides on wall, vertical x10 with foam roller 2 x 10  NP- Standing rows 10 x 3 at freemotion 10lb w/2 handles on 1 side of machine  Supine over a 6" foam roller x 2 min  Wall angels w/lift off x20  thread the needle in quadruped 5x5 ea " "side  Prone rows w/emphasis on scap retraction, holding 6lb weight 3x10  Prone I's 10 x 2 with 2# on each  Prone T's 10 x 2 with 1# on each  Open book x 10 with 5 sec hold  Discussed breathing exercises to richard expand his rib cage    Home Exercises Provided and Patient Education Provided     Education provided:   continu    Written Home Exercises Provided: Patient instructed to cont prior HEP.  Exercises were reviewed and Cesario was able to demonstrate them prior to the end of the session.  Cesario demonstrated good  understanding of the education provided.     See EMR under Patient Instructions for exercises provided prior visit.    Assessment   Pt with no reported headache today, but with c/o R side discomfort secondary to "pulling his back" yesterday while cutting the grass.  Pt had difficulty performing his usual exercise program secondary to R flank pain.  Today is Pt's last scheduled and authorized Physical Therapy appointment.  Pt was encouraged to contact his Primary Care Physician if his pain continues for a Physical Therapy referral.  Pt has met all established Physical Therapy Goals, although his R side is sore today from an unrelated injury.  Discharge Pt to a HEP.       Cesario Is progressing well towards his goals.   Pt prognosis is Excellent.     Pt will continue to benefit from skilled outpatient physical therapy to address the deficits listed in the problem list box on initial evaluation, provide pt/family education and to maximize pt's level of independence in the home and community environment.     Pt's spiritual, cultural and educational needs considered and pt agreeable to plan of care and goals.     Anticipated barriers to physical therapy: none    Goals:  Short Term Goals (4 Weeks):   1. Pt will be compliant with HEP to assist in progressing PT POC and restoring LOF. MET 8/10/2022  2. Pt will report decrease in neck pain to <4/10 average. MET 8/10/2022  3. Pt will improve Cervical Rotation by >10% " as compared to IE in order to improve driving MET 8/10/2022   4. Pt will display good upright posture at rest w/o requiring cueing.  MET 8/10/2022     Long Term Goals (12 Weeks):   1. Pt will report improved completion of ADLs w/less pain (sleeping, carrying, driving, shopping, self care, working) MET 8/10/2022   2. Pt will display improved Cervical Flexion, sidebending and ext to >75% as compared to IE in order to improve functional mobility of cervical spine. MET 8/10/2022   3. Pt will be able to demonstrate upright posture for full tx session without verbal cuing in order to improve work ergonomics and posture. MET 8/10/2022        Plan        Discharge Pt to a HEP.         Pedro Cook, PT

## 2022-09-28 ENCOUNTER — OFFICE VISIT (OUTPATIENT)
Dept: INTERNAL MEDICINE | Facility: CLINIC | Age: 27
End: 2022-09-28
Payer: MEDICAID

## 2022-09-28 VITALS
TEMPERATURE: 98 F | WEIGHT: 136.19 LBS | HEIGHT: 68 IN | SYSTOLIC BLOOD PRESSURE: 104 MMHG | DIASTOLIC BLOOD PRESSURE: 64 MMHG | OXYGEN SATURATION: 99 % | RESPIRATION RATE: 20 BRPM | BODY MASS INDEX: 20.64 KG/M2 | HEART RATE: 89 BPM

## 2022-09-28 DIAGNOSIS — H93.11 TINNITUS OF RIGHT EAR: ICD-10-CM

## 2022-09-28 DIAGNOSIS — H61.22 IMPACTED CERUMEN OF LEFT EAR: Primary | ICD-10-CM

## 2022-09-28 PROCEDURE — 99213 OFFICE O/P EST LOW 20 MIN: CPT | Mod: S$PBB,,, | Performed by: INTERNAL MEDICINE

## 2022-09-28 PROCEDURE — 99999 PR PBB SHADOW E&M-EST. PATIENT-LVL III: CPT | Mod: PBBFAC,,, | Performed by: INTERNAL MEDICINE

## 2022-09-28 PROCEDURE — 99213 PR OFFICE/OUTPT VISIT, EST, LEVL III, 20-29 MIN: ICD-10-PCS | Mod: S$PBB,,, | Performed by: INTERNAL MEDICINE

## 2022-09-28 PROCEDURE — 99999 PR PBB SHADOW E&M-EST. PATIENT-LVL III: ICD-10-PCS | Mod: PBBFAC,,, | Performed by: INTERNAL MEDICINE

## 2022-09-28 PROCEDURE — 3078F DIAST BP <80 MM HG: CPT | Mod: CPTII,,, | Performed by: INTERNAL MEDICINE

## 2022-09-28 PROCEDURE — 3074F PR MOST RECENT SYSTOLIC BLOOD PRESSURE < 130 MM HG: ICD-10-PCS | Mod: CPTII,,, | Performed by: INTERNAL MEDICINE

## 2022-09-28 PROCEDURE — 3078F PR MOST RECENT DIASTOLIC BLOOD PRESSURE < 80 MM HG: ICD-10-PCS | Mod: CPTII,,, | Performed by: INTERNAL MEDICINE

## 2022-09-28 PROCEDURE — 99213 OFFICE O/P EST LOW 20 MIN: CPT | Mod: PBBFAC,PO | Performed by: INTERNAL MEDICINE

## 2022-09-28 PROCEDURE — 3074F SYST BP LT 130 MM HG: CPT | Mod: CPTII,,, | Performed by: INTERNAL MEDICINE

## 2022-09-28 NOTE — PROGRESS NOTES
Subjective:       Patient ID: Patel Ghotra is a 27 y.o. male.    Chief Complaint: Tinnitus    HPI  Pt here for evaluation of a few weeks of recurrent right sided tinnitus. It seemed to start while sick with sinus issues. He treated it with OTC meds and symptoms resolved. No ear pain/discharge.  Review of Systems   Constitutional:  Negative for activity change, appetite change, chills, diaphoresis, fatigue, fever and unexpected weight change.   HENT:  Positive for tinnitus. Negative for postnasal drip, rhinorrhea, sinus pressure/congestion, sneezing, sore throat, trouble swallowing and voice change.    Respiratory:  Negative for cough, shortness of breath and wheezing.    Cardiovascular:  Negative for chest pain, palpitations and leg swelling.   Gastrointestinal:  Negative for abdominal pain, blood in stool, constipation, diarrhea, nausea and vomiting.   Genitourinary:  Negative for dysuria.   Musculoskeletal:  Negative for arthralgias and myalgias.   Integumentary:  Negative for rash and wound.   Allergic/Immunologic: Negative for environmental allergies and food allergies.   Hematological:  Negative for adenopathy. Does not bruise/bleed easily.       Objective:      Physical Exam  Constitutional:       General: He is not in acute distress.     Appearance: Normal appearance. He is well-developed. He is not diaphoretic.   HENT:      Head: Normocephalic and atraumatic.      Right Ear: External ear normal. There is no impacted cerumen.      Left Ear: External ear normal. There is impacted cerumen.      Nose: Nose normal.      Mouth/Throat:      Pharynx: No oropharyngeal exudate.   Eyes:      General: No scleral icterus.        Right eye: No discharge.         Left eye: No discharge.      Conjunctiva/sclera: Conjunctivae normal.      Pupils: Pupils are equal, round, and reactive to light.   Neck:      Vascular: No JVD.   Cardiovascular:      Rate and Rhythm: Normal rate and regular rhythm.      Pulses: Normal  pulses.      Heart sounds: Normal heart sounds. No murmur heard.  Pulmonary:      Effort: Pulmonary effort is normal. No respiratory distress.      Breath sounds: Normal breath sounds. No wheezing or rales.   Abdominal:      General: Bowel sounds are normal.      Tenderness: There is no abdominal tenderness. There is no guarding or rebound.   Musculoskeletal:      Cervical back: Normal range of motion and neck supple.      Right lower leg: No edema.      Left lower leg: No edema.   Lymphadenopathy:      Cervical: No cervical adenopathy.   Skin:     General: Skin is warm and dry.      Capillary Refill: Capillary refill takes less than 2 seconds.      Coloration: Skin is not pale.      Findings: No rash.   Neurological:      Mental Status: He is alert and oriented to person, place, and time. Mental status is at baseline.      Cranial Nerves: No cranial nerve deficit.   Psychiatric:         Mood and Affect: Mood normal.         Behavior: Behavior normal.       Assessment:       Problem List Items Addressed This Visit    None  Visit Diagnoses       Impacted cerumen of left ear    -  Primary    Tinnitus of right ear                Plan:    Pt advised to go to  to have it cleaned out

## 2022-09-29 ENCOUNTER — OFFICE VISIT (OUTPATIENT)
Dept: URGENT CARE | Facility: CLINIC | Age: 27
End: 2022-09-29
Payer: MEDICAID

## 2022-09-29 VITALS
DIASTOLIC BLOOD PRESSURE: 74 MMHG | OXYGEN SATURATION: 99 % | TEMPERATURE: 97 F | HEIGHT: 68 IN | BODY MASS INDEX: 20.61 KG/M2 | SYSTOLIC BLOOD PRESSURE: 150 MMHG | RESPIRATION RATE: 20 BRPM | HEART RATE: 84 BPM | WEIGHT: 136 LBS

## 2022-09-29 DIAGNOSIS — H61.23 BILATERAL IMPACTED CERUMEN: Primary | ICD-10-CM

## 2022-09-29 DIAGNOSIS — H93.19 TINNITUS, UNSPECIFIED LATERALITY: ICD-10-CM

## 2022-09-29 PROCEDURE — 3008F PR BODY MASS INDEX (BMI) DOCUMENTED: ICD-10-PCS | Mod: CPTII,S$GLB,,

## 2022-09-29 PROCEDURE — 99203 PR OFFICE/OUTPT VISIT, NEW, LEVL III, 30-44 MIN: ICD-10-PCS | Mod: S$GLB,,,

## 2022-09-29 PROCEDURE — 1159F PR MEDICATION LIST DOCUMENTED IN MEDICAL RECORD: ICD-10-PCS | Mod: CPTII,S$GLB,,

## 2022-09-29 PROCEDURE — 3077F SYST BP >= 140 MM HG: CPT | Mod: CPTII,S$GLB,,

## 2022-09-29 PROCEDURE — 3077F PR MOST RECENT SYSTOLIC BLOOD PRESSURE >= 140 MM HG: ICD-10-PCS | Mod: CPTII,S$GLB,,

## 2022-09-29 PROCEDURE — 99203 OFFICE O/P NEW LOW 30 MIN: CPT | Mod: S$GLB,,,

## 2022-09-29 PROCEDURE — 3008F BODY MASS INDEX DOCD: CPT | Mod: CPTII,S$GLB,,

## 2022-09-29 PROCEDURE — 1160F RVW MEDS BY RX/DR IN RCRD: CPT | Mod: CPTII,S$GLB,,

## 2022-09-29 PROCEDURE — 1159F MED LIST DOCD IN RCRD: CPT | Mod: CPTII,S$GLB,,

## 2022-09-29 PROCEDURE — 3078F DIAST BP <80 MM HG: CPT | Mod: CPTII,S$GLB,,

## 2022-09-29 PROCEDURE — 3078F PR MOST RECENT DIASTOLIC BLOOD PRESSURE < 80 MM HG: ICD-10-PCS | Mod: CPTII,S$GLB,,

## 2022-09-29 PROCEDURE — 1160F PR REVIEW ALL MEDS BY PRESCRIBER/CLIN PHARMACIST DOCUMENTED: ICD-10-PCS | Mod: CPTII,S$GLB,,

## 2022-09-29 NOTE — PROGRESS NOTES
"Subjective:       Patient ID: Patel Ghotra is a 27 y.o. male.    Vitals:  height is 5' 8" (1.727 m) and weight is 61.7 kg (136 lb). His temperature is 97.1 °F (36.2 °C). His blood pressure is 150/74 (abnormal) and his pulse is 84. His respiration is 20 and oxygen saturation is 99%.     Chief Complaint: Ear Fullness and Tinnitus    Patel Ghotra is a 27 y.o. male who presents for L ear pain which onset 1 week ago. He notes sxs onset after a sinus infection. Associated sxs include L ear ringing and hearing loss. Patient denies any fever, chills, SOB, CP, abd pain, n/v/d, rash, dizziness, or numbness/tingling. Prior Tx includes cleaning with Q-tip.    Sinus Problem  This is a new problem. The current episode started in the past 7 days. The problem has been gradually worsening since onset. There has been no fever. Associated symptoms include ear pain. Pertinent negatives include no chills, headaches or shortness of breath. (Ringing in ears and fullness )     Constitution: Negative for chills and fever.   HENT:  Positive for ear pain, tinnitus and hearing loss. Negative for ear discharge and foreign body in ear.    Cardiovascular:  Negative for chest pain.   Respiratory:  Negative for shortness of breath.    Gastrointestinal:  Negative for nausea, vomiting and diarrhea.   Neurological:  Negative for dizziness, headaches, numbness and tingling.     Objective:      Physical Exam   Constitutional: He is oriented to person, place, and time. He appears well-developed. He is cooperative.  Non-toxic appearance. He does not appear ill. No distress.   HENT:   Head: Normocephalic and atraumatic.   Ears:   Right Ear: Hearing and external ear normal. impacted cerumen  Left Ear: Hearing and external ear normal. impacted cerumen     Comments: After disimpaction, no TM bulging, erythema, or perforation. No canal edema or erythema. No discharge.   Nose: Nose normal. No mucosal edema, rhinorrhea or nasal deformity. No " epistaxis. Right sinus exhibits no maxillary sinus tenderness and no frontal sinus tenderness. Left sinus exhibits no maxillary sinus tenderness and no frontal sinus tenderness.   Mouth/Throat: Uvula is midline, oropharynx is clear and moist and mucous membranes are normal. Mucous membranes are moist. No trismus in the jaw. Normal dentition. No uvula swelling. No posterior oropharyngeal edema.   Eyes: Conjunctivae and lids are normal. No scleral icterus. Right eye exhibits no nystagmus. Left eye exhibits no nystagmus. Extraocular movement intact vision grossly intact gaze aligned appropriately   Neck: Trachea normal and phonation normal. Neck supple. No edema present. No erythema present. No neck rigidity present.   Cardiovascular: Normal rate, regular rhythm, normal heart sounds and normal pulses.   Pulmonary/Chest: Effort normal and breath sounds normal. No respiratory distress. He has no decreased breath sounds. He has no rhonchi.   Abdominal: Normal appearance.   Musculoskeletal: Normal range of motion.         General: No deformity. Normal range of motion.   Neurological: He is alert and oriented to person, place, and time. He has normal sensation. He exhibits normal muscle tone. Coordination normal.      Comments: (-) [place]-Hallpike   Skin: Skin is warm, dry, intact, not diaphoretic and not pale.   Psychiatric: His speech is normal and behavior is normal. Judgment and thought content normal.   Nursing note and vitals reviewed.      Assessment:       1. Bilateral impacted cerumen    2. Tinnitus, unspecified laterality          Plan:         Bilateral impacted cerumen    Tinnitus, unspecified laterality  -     Ambulatory referral/consult to ENT    Patient reports after disimpaction, hearing/ringing has not improved. Patient is on Amitriptyline - he discontinued it due to sxs. He was evaluated by his PCP who referred him to Urgent Care for cerumen removal.    On exam, no nystagmus or pupillary changes. No vertigo,  dizziness, or cerebellar dysfunction. Negative [place]-Hallpike.     Discussed with patient the need to f/u with his PCP regarding hearing loss/tinnitus. Patient verbalizes understanding. A referral was placed to ENT.    Discussed with patient all pertinent information and results. Discussed patient diagnosis and plan of treatment. Patient was given all f/u and return instructions. All questions and concerns were addressed at this time. Patient expresses understanding of information and instructions, and is comfortable with plan.    Patient was instructed to return to clinic or go to ED immediately for any worsening or change in current symptoms.

## 2022-09-29 NOTE — PROGRESS NOTES
Subjective:       Patient ID: Patel Ghotra is a 27 y.o. male.    Chief Complaint: No chief complaint on file.    HPI  ROS     Objective:      Physical Exam    Assessment:       No diagnosis found.    Plan:                   No follow-ups on file.

## 2022-09-30 ENCOUNTER — TELEPHONE (OUTPATIENT)
Dept: INTERNAL MEDICINE | Facility: CLINIC | Age: 27
End: 2022-09-30
Payer: MEDICAID

## 2022-09-30 NOTE — TELEPHONE ENCOUNTER
----- Message from Lea Campbell sent at 9/29/2022  5:43 PM CDT -----  .Type:  Needs Medical Advice    Who Called: self   Symptoms (please be specific): ringing in ear    How long has patient had these symptoms:  about a week to 2 weeks     Would the patient rather a call back or a response via [facility]? Call back   Best Call Back Number: [phone]  Additional Information: pt would like to see dr about rining in his ear please give pt a call back to schedule an appointment pt went to urgent care to flush ears and pt is still having the same problems

## 2022-09-30 NOTE — TELEPHONE ENCOUNTER
Patient not currently have ear ringing .  He was given a referral to ENT just wanted to know if he should make and appointment. I gave him the number to call if it reoccurred.

## 2022-10-01 ENCOUNTER — OFFICE VISIT (OUTPATIENT)
Dept: URGENT CARE | Facility: CLINIC | Age: 27
End: 2022-10-01
Payer: MEDICAID

## 2022-10-01 VITALS
HEIGHT: 68 IN | BODY MASS INDEX: 20.61 KG/M2 | SYSTOLIC BLOOD PRESSURE: 147 MMHG | OXYGEN SATURATION: 98 % | RESPIRATION RATE: 17 BRPM | DIASTOLIC BLOOD PRESSURE: 87 MMHG | HEART RATE: 77 BPM | TEMPERATURE: 98 F | WEIGHT: 136 LBS

## 2022-10-01 DIAGNOSIS — F41.9 ANXIETY: Primary | ICD-10-CM

## 2022-10-01 DIAGNOSIS — H69.92 DYSFUNCTION OF LEFT EUSTACHIAN TUBE: ICD-10-CM

## 2022-10-01 PROCEDURE — 99204 PR OFFICE/OUTPT VISIT, NEW, LEVL IV, 45-59 MIN: ICD-10-PCS | Mod: S$GLB,,, | Performed by: PHYSICIAN ASSISTANT

## 2022-10-01 PROCEDURE — 3079F PR MOST RECENT DIASTOLIC BLOOD PRESSURE 80-89 MM HG: ICD-10-PCS | Mod: CPTII,S$GLB,, | Performed by: PHYSICIAN ASSISTANT

## 2022-10-01 PROCEDURE — 1160F RVW MEDS BY RX/DR IN RCRD: CPT | Mod: CPTII,S$GLB,, | Performed by: PHYSICIAN ASSISTANT

## 2022-10-01 PROCEDURE — 1160F PR REVIEW ALL MEDS BY PRESCRIBER/CLIN PHARMACIST DOCUMENTED: ICD-10-PCS | Mod: CPTII,S$GLB,, | Performed by: PHYSICIAN ASSISTANT

## 2022-10-01 PROCEDURE — 1159F MED LIST DOCD IN RCRD: CPT | Mod: CPTII,S$GLB,, | Performed by: PHYSICIAN ASSISTANT

## 2022-10-01 PROCEDURE — 3008F PR BODY MASS INDEX (BMI) DOCUMENTED: ICD-10-PCS | Mod: CPTII,S$GLB,, | Performed by: PHYSICIAN ASSISTANT

## 2022-10-01 PROCEDURE — 1159F PR MEDICATION LIST DOCUMENTED IN MEDICAL RECORD: ICD-10-PCS | Mod: CPTII,S$GLB,, | Performed by: PHYSICIAN ASSISTANT

## 2022-10-01 PROCEDURE — 3079F DIAST BP 80-89 MM HG: CPT | Mod: CPTII,S$GLB,, | Performed by: PHYSICIAN ASSISTANT

## 2022-10-01 PROCEDURE — 3077F SYST BP >= 140 MM HG: CPT | Mod: CPTII,S$GLB,, | Performed by: PHYSICIAN ASSISTANT

## 2022-10-01 PROCEDURE — 3008F BODY MASS INDEX DOCD: CPT | Mod: CPTII,S$GLB,, | Performed by: PHYSICIAN ASSISTANT

## 2022-10-01 PROCEDURE — 3077F PR MOST RECENT SYSTOLIC BLOOD PRESSURE >= 140 MM HG: ICD-10-PCS | Mod: CPTII,S$GLB,, | Performed by: PHYSICIAN ASSISTANT

## 2022-10-01 PROCEDURE — 99204 OFFICE O/P NEW MOD 45 MIN: CPT | Mod: S$GLB,,, | Performed by: PHYSICIAN ASSISTANT

## 2022-10-01 RX ORDER — HYDROXYZINE PAMOATE 25 MG/1
25 CAPSULE ORAL 3 TIMES DAILY PRN
Qty: 60 CAPSULE | Refills: 1 | Status: SHIPPED | OUTPATIENT
Start: 2022-10-01 | End: 2022-10-31

## 2022-10-01 RX ORDER — FLUTICASONE PROPIONATE 50 MCG
1 SPRAY, SUSPENSION (ML) NASAL DAILY
Qty: 16 G | Refills: 3 | Status: SHIPPED | OUTPATIENT
Start: 2022-10-01

## 2022-10-01 NOTE — PROGRESS NOTES
"Subjective:       Patient ID: Patel Ghotra is a 27 y.o. male.    Vitals:  height is 5' 8" (1.727 m) and weight is 61.7 kg (136 lb). His oral temperature is 98.2 °F (36.8 °C). His blood pressure is 147/87 (abnormal) and his pulse is 77. His respiration is 17 and oxygen saturation is 98%.     Chief Complaint: Otalgia    Pt states that he is having left ear pain for a week now . Pain level is 4 .  Patie recent URI symptoms nt states he has been having intermittent ringing in left ear for last week since stopping his amitriptyline.  No fever chills nausea vomiting diarrhea.  No recent URI symptoms    Otalgia   There is pain in the left ear. This is a new problem. The current episode started in the past 7 days. The problem occurs constantly. The problem has been unchanged. There has been no fever.     HENT:  Positive for ear pain and tinnitus.    Skin:  Negative for erythema.   Psychiatric/Behavioral:  Positive for nervous/anxious. The patient is nervous/anxious.      Objective:      Physical Exam   Constitutional: He is oriented to person, place, and time. He appears well-developed. He is cooperative.  Non-toxic appearance. He does not appear ill. No distress.   HENT:   Head: Normocephalic and atraumatic.   Ears:   Right Ear: Hearing, tympanic membrane, external ear and ear canal normal.   Left Ear: Hearing normal.      Comments: Small amount of fluid behind left tympanic membrane with tympanic membranes slightly retracted.  No erythema.   fluid appears to be clear  Nose: Nose normal. No mucosal edema, rhinorrhea or nasal deformity. No epistaxis. Right sinus exhibits no maxillary sinus tenderness and no frontal sinus tenderness. Left sinus exhibits no maxillary sinus tenderness and no frontal sinus tenderness.   Mouth/Throat: Uvula is midline, oropharynx is clear and moist and mucous membranes are normal. No trismus in the jaw. Normal dentition. No uvula swelling. No oropharyngeal exudate, posterior " oropharyngeal edema or posterior oropharyngeal erythema.   Eyes: Conjunctivae, EOM and lids are normal. Pupils are equal, round, and reactive to light. Right eye exhibits no discharge. Left eye exhibits no discharge. No scleral icterus.   Neck: Trachea normal and phonation normal. Neck supple. No JVD present. No tracheal deviation present. No thyromegaly present. No edema present. No erythema present. No neck rigidity present.   Cardiovascular: Normal rate, regular rhythm, normal heart sounds and normal pulses.   No murmur heard.Exam reveals no gallop and no friction rub.   Pulmonary/Chest: Effort normal and breath sounds normal. No stridor. No respiratory distress. He has no decreased breath sounds. He has no wheezes. He has no rhonchi. He has no rales. He exhibits no tenderness.   Abdominal: Normal appearance. He exhibits no distension. Soft. There is no abdominal tenderness. There is no rebound and no guarding.   Musculoskeletal: Normal range of motion.         General: No deformity. Normal range of motion.   Neurological: He is alert and oriented to person, place, and time. He exhibits normal muscle tone. Coordination normal.   Skin: Skin is warm, dry, intact, not diaphoretic, not pale and no rash. Capillary refill takes less than 2 seconds. No erythema   Psychiatric: His speech is normal and behavior is normal. Judgment and thought content normal.   Nursing note and vitals reviewed.      Assessment:       1. Anxiety    2. Dysfunction of left eustachian tube          Plan:         Anxiety  -     hydrOXYzine pamoate (VISTARIL) 25 MG Cap; Take 1 capsule (25 mg total) by mouth 3 (three) times daily as needed (Anxiety).  Dispense: 60 capsule; Refill: 1    Dysfunction of left eustachian tube  -     fluticasone propionate (FLONASE) 50 mcg/actuation nasal spray; 1 spray (50 mcg total) by Each Nostril route once daily.  Dispense: 16 g; Refill: 3  -     hydrOXYzine pamoate (VISTARIL) 25 MG Cap; Take 1 capsule (25 mg  total) by mouth 3 (three) times daily as needed (Anxiety).  Dispense: 60 capsule; Refill: 1  Follow up if symptoms worsen or fail to improve, for F/U with PCP or ED. There are no Patient Instructions on file for this visit.

## 2022-11-01 ENCOUNTER — TELEPHONE (OUTPATIENT)
Dept: INTERNAL MEDICINE | Facility: CLINIC | Age: 27
End: 2022-11-01
Payer: MEDICAID

## 2022-11-01 ENCOUNTER — TELEPHONE (OUTPATIENT)
Dept: OTOLARYNGOLOGY | Facility: CLINIC | Age: 27
End: 2022-11-01
Payer: MEDICAID

## 2022-11-01 NOTE — TELEPHONE ENCOUNTER
Spoke to pt and suggest that he call ENT to get in sooner since he experience hearing loss now.  Patient was given the number to Ent

## 2022-11-01 NOTE — TELEPHONE ENCOUNTER
----- Message from Melinda Ring sent at 11/1/2022  3:50 PM CDT -----  Regarding: appt- urgent.. sooner than next year  Contact: PT @ [phone]  Pt is calling to speak to someone in the office to discuss having a sooner appt than next year. Pt states that this is urgent and is unable to wait until, 3/8/2023. Pt states that his symptoms are getting worse and he is now experiencing hearing loss; also Tinnitus. No available appts in Epic. Please call. Thanks.

## 2022-11-01 NOTE — TELEPHONE ENCOUNTER
----- Message from Kelly Ace sent at 11/1/2022 11:42 AM CDT -----  Contact: [phone]  Pt called to advise that he is having issues with partial hearing loss and tinnitus. Pt says is started a month ago went away and has since come back. Pt has medicaid so nothing offered on my end. Please Advise

## 2022-12-06 ENCOUNTER — OFFICE VISIT (OUTPATIENT)
Dept: OTOLARYNGOLOGY | Facility: CLINIC | Age: 27
End: 2022-12-06
Payer: MEDICAID

## 2022-12-06 ENCOUNTER — CLINICAL SUPPORT (OUTPATIENT)
Dept: AUDIOLOGY | Facility: CLINIC | Age: 27
End: 2022-12-06
Payer: MEDICAID

## 2022-12-06 DIAGNOSIS — H93.13 TINNITUS AURIUM, BILATERAL: Primary | ICD-10-CM

## 2022-12-06 DIAGNOSIS — H93.13 TINNITUS, BILATERAL: Primary | ICD-10-CM

## 2022-12-06 DIAGNOSIS — H93.13 TINNITUS AURIUM, BILATERAL: ICD-10-CM

## 2022-12-06 PROCEDURE — 99203 OFFICE O/P NEW LOW 30 MIN: CPT | Mod: S$PBB,,, | Performed by: NURSE PRACTITIONER

## 2022-12-06 PROCEDURE — 99999 PR PBB SHADOW E&M-EST. PATIENT-LVL III: ICD-10-PCS | Mod: PBBFAC,,, | Performed by: NURSE PRACTITIONER

## 2022-12-06 PROCEDURE — 99999 PR PBB SHADOW E&M-EST. PATIENT-LVL III: CPT | Mod: PBBFAC,,, | Performed by: NURSE PRACTITIONER

## 2022-12-06 PROCEDURE — 99213 OFFICE O/P EST LOW 20 MIN: CPT | Mod: PBBFAC | Performed by: NURSE PRACTITIONER

## 2022-12-06 PROCEDURE — 92567 TYMPANOMETRY: CPT | Mod: PBBFAC | Performed by: AUDIOLOGIST

## 2022-12-06 PROCEDURE — 1160F PR REVIEW ALL MEDS BY PRESCRIBER/CLIN PHARMACIST DOCUMENTED: ICD-10-PCS | Mod: CPTII,,, | Performed by: NURSE PRACTITIONER

## 2022-12-06 PROCEDURE — 1160F RVW MEDS BY RX/DR IN RCRD: CPT | Mod: CPTII,,, | Performed by: NURSE PRACTITIONER

## 2022-12-06 PROCEDURE — 1159F PR MEDICATION LIST DOCUMENTED IN MEDICAL RECORD: ICD-10-PCS | Mod: CPTII,,, | Performed by: NURSE PRACTITIONER

## 2022-12-06 PROCEDURE — 92557 COMPREHENSIVE HEARING TEST: CPT | Mod: PBBFAC | Performed by: AUDIOLOGIST

## 2022-12-06 PROCEDURE — 1159F MED LIST DOCD IN RCRD: CPT | Mod: CPTII,,, | Performed by: NURSE PRACTITIONER

## 2022-12-06 PROCEDURE — 99203 PR OFFICE/OUTPT VISIT, NEW, LEVL III, 30-44 MIN: ICD-10-PCS | Mod: S$PBB,,, | Performed by: NURSE PRACTITIONER

## 2022-12-06 NOTE — PROGRESS NOTES
Patel Ghotra, a 27 y.o. male, was seen today in the clinic for an audiologic evaluation.  The patient's main complaint was tinnitus.  Pt reported bilateral tinnitus that was intermittent in the beginning, but is now constant.  Tinnitus does vary in intensity and is triggered by stress.  He indicated that he has one episode of vertigo that resulted in emesis.  Pt denied otalgia and aural fullness.    Tympanometry revealed Type A in the right ear and Type A in the left ear.  Audiogram results revealed normal hearing bilaterally.  Speech reception thresholds were noted at 0 dB in the right ear and 0 dB in the left ear.  Speech discrimination scores were 100% in the right ear and 100% in the left ear.    Recommendations:  Otologic evaluation  Annual audiogram  Hearing protection when in noise

## 2022-12-06 NOTE — PROGRESS NOTES
Subjective:      Patel Ghotra is a 27 y.o. male who was self-referred for tinnitus.    Mr. Ghotra reports having ringing in both ears that comes and goes over the past month. He feels the tinnitus is now persistent but does fluctuate in severity. He denies hearing loss, ear pain, and ear drainage.  He reports having vertigo a week ago with associated nausea that lasted several hours then resolved. He denies any linger imbalance. He does have history of migraine. He was taking amitriptyline, but not currently. He will be following up with neurology. Patient feels since he stopped taking amitriptyline the tinnitus began. There is not a family history of hearing loss at a young age.  There is not a prior history of ear surgery.  There is not a prior history of ear infections .  He denies a history of significant noise exposure.  He does not wear hearing aids currently.  He has not had a hearing test recently.     Past Medical History  He has a past medical history of Dental infection, Migraine with aura and without status migrainosus, not intractable, Palpitations, and Urinary hesitancy.    Past Surgical History  He has a past surgical history that includes [place] tooth extraction and Cystoscopy (N/A, 6/23/2021).    Family History  His family history includes Diabetes in his maternal grandmother and mother; Drug abuse in his father; Heart disease in his father; Hypertension in his maternal grandmother.    Social History  He reports that he quit smoking about 4 years ago. His smoking use included cigarettes. He has never used smokeless tobacco. He reports current alcohol use. He reports current drug use. Frequency: 20.00 times per week. Drug: Marijuana.    Allergies  He is allergic to amoxicillin.    Medications  He has a current medication list which includes the following prescription(s): fluticasone propionate, ibuprofen, ondansetron, and rizatriptan.    Review of Systems   Constitutional:  Positive for  appetite change and chills. Negative for fever and unexpected weight change.   HENT:  Positive for ear pain, hearing loss, sinus pressure and tinnitus. Negative for ear discharge, sore throat and trouble swallowing.    Eyes:  Positive for photophobia. Negative for pain and visual disturbance.   Respiratory:  Positive for shortness of breath. Negative for apnea.    Cardiovascular:  Positive for chest pain. Negative for palpitations.   Gastrointestinal:  Positive for abdominal pain, constipation and vomiting. Negative for nausea.   Endocrine: Positive for cold intolerance. Negative for heat intolerance.   Genitourinary:  Positive for difficulty urinating.   Musculoskeletal:  Positive for back pain and neck pain. Negative for joint swelling and neck stiffness.   Skin: Negative.  Negative for color change and rash.   Allergic/Immunologic: Positive for food allergies.   Neurological:  Positive for dizziness, tremors, light-headedness and headaches. Negative for facial asymmetry.   Hematological: Negative.  Negative for adenopathy. Does not bruise/bleed easily.   Psychiatric/Behavioral:  Positive for decreased concentration and sleep disturbance. Negative for agitation. The patient is nervous/anxious.         Objective:     There were no vitals taken for this visit.     Constitutional:   Vital signs are normal. He appears well-developed and well-nourished.     Head:  Normocephalic and atraumatic.     Ears:    Right Ear: No lacerations. No drainage, swelling or tenderness. No foreign bodies. No mastoid tenderness. Tympanic membrane is not injected, not scarred, not perforated, not erythematous, not retracted and not bulging. Tympanic membrane mobility is normal. No middle ear effusion. No hemotympanum. No decreased hearing is noted.   Left Ear: No lacerations. No drainage, swelling or tenderness. No foreign bodies. No mastoid tenderness. Tympanic membrane is not injected, not scarred, not perforated, not erythematous, not  retracted and not bulging. Tympanic membrane mobility is normal.  No middle ear effusion. No hemotympanum. No decreased hearing is noted.     Nose:  Nose normal including turbinates, nasal mucosa, sinuses and nasal septum.     Mouth/Throat  Lips, teeth, and gums normal and oropharynx normal.     Neck:  Neck normal without thyromegaly masses, asymmetry, normal tracheal structure, crepitus, and tenderness and no adenopathy.     Psychiatric:   He has a normal mood and affect.     Procedure    None      Data Reviewed    WBC (K/uL)   Date Value   07/09/2022 5.42     Platelets (K/uL)   Date Value   07/09/2022 278      Creatinine (mg/dL)   Date Value   07/09/2022 1.0     TSH (uIU/mL)   Date Value   07/09/2022 0.998     Glucose (mg/dL)   Date Value   07/09/2022 101     No results found for: HGBA1C      I independently reviewed the tracings of the complete audiometric evaluation performed today.  I reviewed the audiogram with the patient as well.  Pertinent findings include a normal study.         Assessment:     1. Tinnitus aurium, bilateral         Plan:     Audiometric testing reveals normal hearing in both ears.  He reports his tinnitus began after recently stopping amytriptyline for migraine management. He will be following up with neurology for migraine.  Discussed the etiology of tinnitus and management strategies including the use of background sound enrichment.  Further instructed that avoidance of caffeine, alcohol, tobacco, vaping, and stress can be of benefit.  Reassurance was given to the patient.        Follow up if symptoms worsen or fail to improve.

## 2022-12-19 ENCOUNTER — OFFICE VISIT (OUTPATIENT)
Dept: NEUROLOGY | Facility: CLINIC | Age: 27
End: 2022-12-19
Payer: MEDICAID

## 2022-12-19 DIAGNOSIS — G43.109 MIGRAINE WITH AURA AND WITHOUT STATUS MIGRAINOSUS, NOT INTRACTABLE: Primary | ICD-10-CM

## 2022-12-19 DIAGNOSIS — G44.86 CERVICOGENIC HEADACHE: ICD-10-CM

## 2022-12-19 DIAGNOSIS — H93.19 TINNITUS, UNSPECIFIED LATERALITY: ICD-10-CM

## 2022-12-19 PROCEDURE — 1159F PR MEDICATION LIST DOCUMENTED IN MEDICAL RECORD: ICD-10-PCS | Mod: CPTII,95,, | Performed by: PHYSICIAN ASSISTANT

## 2022-12-19 PROCEDURE — 1159F MED LIST DOCD IN RCRD: CPT | Mod: CPTII,95,, | Performed by: PHYSICIAN ASSISTANT

## 2022-12-19 PROCEDURE — 1160F PR REVIEW ALL MEDS BY PRESCRIBER/CLIN PHARMACIST DOCUMENTED: ICD-10-PCS | Mod: CPTII,95,, | Performed by: PHYSICIAN ASSISTANT

## 2022-12-19 PROCEDURE — 99215 PR OFFICE/OUTPT VISIT, EST, LEVL V, 40-54 MIN: ICD-10-PCS | Mod: 95,,, | Performed by: PHYSICIAN ASSISTANT

## 2022-12-19 PROCEDURE — 99215 OFFICE O/P EST HI 40 MIN: CPT | Mod: 95,,, | Performed by: PHYSICIAN ASSISTANT

## 2022-12-19 PROCEDURE — 1160F RVW MEDS BY RX/DR IN RCRD: CPT | Mod: CPTII,95,, | Performed by: PHYSICIAN ASSISTANT

## 2022-12-19 RX ORDER — RIZATRIPTAN BENZOATE 10 MG/1
TABLET, ORALLY DISINTEGRATING ORAL
Qty: 12 TABLET | Refills: 5 | Status: SHIPPED | OUTPATIENT
Start: 2022-12-19 | End: 2023-12-19

## 2022-12-19 NOTE — PROGRESS NOTES
New Patient     The patient location is: [place]  The chief complaint leading to consultation is: headache     Visit type: audiovisual    Face to Face time with patient: 34 min  40 minutes of total time spent on the encounter, which includes face to face time and non-face to face time preparing to see the patient (eg, review of tests), Obtaining and/or reviewing separately obtained history, Documenting clinical information in the electronic or other health record, Independently interpreting results (not separately reported) and communicating results to the patient/family/caregiver, or Care coordination (not separately reported).     Each patient to whom he or she provides medical services by telemedicine is:  (1) informed of the relationship between the physician and patient and the respective role of any other health care provider with respect to management of the patient; and (2) notified that he or she may decline to receive medical services by telemedicine and may withdraw from such care at any time.    Notes:       SUBJECTIVE:  Patient ID: Patel Ghotra   MRN: [id number]  Referred By: Aaareferral Self  Chief Complaint: Headache      History of Present Illness:   27 y.o. male with migraines, who presents to virtual visit alone for evaluation of headaches.   Family Hx + for Migraines grandmother    Pt has been suffering from migraines since his teens that worsened in 2021 around the time of increased stress w/ associated visual auras. He was seen by colleague TAMIA Johnson, will transition care to me due to provider leaving [facility]. Last visit on 7/8/22, pt was started on elavil 25mg qhs, maxalt 10mg prn, and advised to continue PT for cervicogenic component. Pt began tracking as advised. Per diary, pt began elavil in July/August. Experienced 5 HA days in august, 11 HA days in September, and 1 HA day in October. He subsequently stopped taking elavil in October. Has had 0 ha days in November, and 1 mild one in  December. Longest HA logged was 31 hours. Severities range 2-5/10. Pt did note an improved sleep schedule while on elavil and improvement of his tinnitus. Since d/c of elavil his tinnitus has worsened and occurs daily. Was seen by ENT who advised neuro referral. Pt also tried maxalt 1-2 times often waiting after onset for the HA to become severe. Finds it only dulls ha's.   Pt further describes ha's as follows:   Location/Radiation - bilateral frontalis, occipitalis, temples  Quality - aching, pressure  Triggers - stress, excessive sugar, dehydrated, poor sleep  Prodrome/Aura - blurry vision, electrostatic moving horizontally, lightning bolts, bright halos  Post drome - fatigue  Time of day of most headaches- anytime   Associated symptoms with the headache: photophobia, blurry vision, phonophobia, nausea, tinnitus, neck tightness    Treatments Tried   Elavil - helped  Fioricet  Tylenol  Naproxen  Imitrex 100mg - somewhat helps, not taken at onset  Maxalt 10mg - somewhat helps, not taken at onset  PT    Current Medications:    Current Outpatient Medications:     fluticasone propionate (FLONASE) 50 mcg/actuation nasal spray, 1 spray (50 mcg total) by Each Nostril route once daily., Disp: 16 g, Rfl: 3    ibuprofen (ADVIL,MOTRIN) 800 MG tablet, Take 800 mg by mouth 3 (three) times daily., Disp: , Rfl:     ondansetron (ZOFRAN-ODT) 8 MG TbDL, Take 1 tablet (8 mg total) by mouth every 12 (twelve) hours as needed (Nausea ). (Patient not taking: No sig reported), Disp: 20 tablet, Rfl: 6    rizatriptan (MAXALT-MLT) 10 MG disintegrating tablet, Dissolve 1 tab on tongue at onset of migraine, may repeat dose in 2 hours if needed. Max 3 tabs/day. Max 3 days/week., Disp: 12 tablet, Rfl: 5    Review of Systems - as per HPI, otherwise a balanced 10 systems review is negative.    OBJECTIVE:  Vitals:  There were no vitals taken for this visit.    Physical Exam: certain limitations due to video visit platform, able to perform the  following with the patient's assistance:  Constitutional: he appears well-developed and well-nourished. he is well groomed. NAD   HENT:    Head: Normocephalic and atraumatic  Eyes: Conjunctivae and EOM are normal  Musculoskeletal: Normal range of motion. No joint stiffness.   Psychiatric: Mood and affect are normal    Neuro: Patient is alert and oriented to person, place, and time. Language is intact and fluent. Speech is clear and fluent. Recent and remote memory are intact.  Normal attention and concentration.  Facial movement is symmetric. Moves all 4 extremities against gravity. Gait and station normal.  Cranial Nerves II through XII without focal deficit.       Review of Data:   Notes from neuro reviewed   Labs:  No visits with results within 3 Month(s) from this visit.   Latest known visit with results is:   Lab Visit on 07/09/2022   Component Date Value Ref Range Status    WBC 07/09/2022 5.42  3.90 - 12.70 K/uL Final    RBC 07/09/2022 5.13  4.60 - 6.20 M/uL Final    Hemoglobin 07/09/2022 15.7  14.0 - 18.0 g/dL Final    Hematocrit 07/09/2022 46.5  40.0 - 54.0 % Final    MCV 07/09/2022 91  82 - 98 fL Final    MCH 07/09/2022 30.6  27.0 - 31.0 pg Final    MCHC 07/09/2022 33.8  32.0 - 36.0 g/dL Final    RDW 07/09/2022 11.9  11.5 - 14.5 % Final    Platelets 07/09/2022 278  150 - 450 K/uL Final    MPV 07/09/2022 10.1  9.2 - 12.9 fL Final    Immature Granulocytes 07/09/2022 0.2  0.0 - 0.5 % Final    Gran # (ANC) 07/09/2022 2.7  1.8 - 7.7 K/uL Final    Immature Grans (Abs) 07/09/2022 0.01  0.00 - 0.04 K/uL Final    Lymph # 07/09/2022 2.2  1.0 - 4.8 K/uL Final    Mono # 07/09/2022 0.4  0.3 - 1.0 K/uL Final    Eos # 07/09/2022 0.2  0.0 - 0.5 K/uL Final    Baso # 07/09/2022 0.04  0.00 - 0.20 K/uL Final    nRBC 07/09/2022 0  0 /100 WBC Final    Gran % 07/09/2022 49.1  38.0 - 73.0 % Final    Lymph % 07/09/2022 39.7  18.0 - 48.0 % Final    Mono % 07/09/2022 6.6  4.0 - 15.0 % Final    Eosinophil % 07/09/2022 3.7  0.0 - 8.0 %  Final    Basophil % 07/09/2022 0.7  0.0 - 1.9 % Final    Differential Method 07/09/2022 Automated   Final    Sodium 07/09/2022 139  136 - 145 mmol/L Final    Potassium 07/09/2022 4.3  3.5 - 5.1 mmol/L Final    Chloride 07/09/2022 100  95 - 110 mmol/L Final    CO2 07/09/2022 26  23 - 29 mmol/L Final    Glucose 07/09/2022 101  70 - 110 mg/dL Final    BUN 07/09/2022 14  6 - 20 mg/dL Final    Creatinine 07/09/2022 1.0  0.5 - 1.4 mg/dL Final    Calcium 07/09/2022 10.2  8.7 - 10.5 mg/dL Final    Total Protein 07/09/2022 8.3  6.0 - 8.4 g/dL Final    Albumin 07/09/2022 4.8  3.5 - 5.2 g/dL Final    Total Bilirubin 07/09/2022 0.9  0.1 - 1.0 mg/dL Final    Alkaline Phosphatase 07/09/2022 87  55 - 135 U/L Final    AST 07/09/2022 22  10 - 40 U/L Final    ALT 07/09/2022 26  10 - 44 U/L Final    Anion Gap 07/09/2022 13  8 - 16 mmol/L Final    eGFR if African American 07/09/2022 >60.0  >60 mL/min/1.73 m^2 Final    eGFR if non African American 07/09/2022 >60.0  >60 mL/min/1.73 m^2 Final    TSH 07/09/2022 0.998  0.400 - 4.000 uIU/mL Final    Cholesterol 07/09/2022 205 (H)  120 - 199 mg/dL Final    Triglycerides 07/09/2022 123  30 - 150 mg/dL Final    HDL 07/09/2022 61  40 - 75 mg/dL Final    LDL Cholesterol 07/09/2022 119.4  63.0 - 159.0 mg/dL Final    HDL/Cholesterol Ratio 07/09/2022 29.8  20.0 - 50.0 % Final    Total Cholesterol/HDL Ratio 07/09/2022 3.4  2.0 - 5.0 Final    Non-HDL Cholesterol 07/09/2022 144  mg/dL Final     Imaging:  Results for orders placed or performed during the hospital encounter of 03/21/22   MRI Brain Without Contrast    Narrative    EXAMINATION:  MRI BRAIN WITHOUT CONTRAST    CLINICAL HISTORY:  Headache, chronic, new features or increased frequency;Migraine with aura, not intractable, without status migrainosus    TECHNIQUE:  Multiplanar multisequence MR imaging of the brain was performed without intravenous contrast.    COMPARISON:  No priors    FINDINGS:  Intracranial Compartment:    Ventricles are normal  in size for age without evidence of hydrocephalus.    The brain parenchyma appears within normal limits.  No mass, hemorrhage, or recent or remote major vascular distribution infarct.    No extra-axial blood or fluid collections.    Normal vascular flow voids are preserved.    Skull/Extracranial Contents (limited evaluation):    Bone marrow signal intensity is normal.      Impression    No evidence of acute intracranial pathology.      Electronically signed by: Anirudh Vargas MD  Date:    03/22/2022  Time:    06:17     Note: I have independently reviewed any/all imaging/labs/tests and agree with the report (s) as documented.  Any discrepancies will be as noted/demarcated by free text.  VINICIUS LOPEZ 12/19/2022    ASSESSMENT:  1. Migraine with aura and without status migrainosus, not intractable    2. Cervicogenic headache    3. Tinnitus, unspecified laterality          PLAN:  - Discussed symptoms appear to be consistent with migraines w/ cervicgoenic component. Discussed this with patient along with treatment options and patient agreed with the following plan  - abortive - take maxalt 10mg at onset of ha's  - tinnitus - referral to neuro per ent recs  - cervicogenic component - complete PT  - importance of healthy diet, regular exercise and sleep hygiene in the treatment of headaches    - Start tracking headaches via Migraine Ady gretel on phone   - RTC prn     Orders Placed This Encounter    Ambulatory referral/consult to Neurology    rizatriptan (MAXALT-MLT) 10 MG disintegrating tablet       I have discussed realistic goals of care with patient at length as well as medication options, and need for lifestyle adjustment. I have explained that treatment will take time. We have agreed that the goal will be to reduce frequency/intensity/quantity of HA, not to be completely HA free. I have explained my non narcotic policy regarding headache treatment.    Patient agreeable to work on lifestyle adjustments.    Questions and  concerns were sought and answered to the patient's stated verbal satisfaction.  The patient verbalizes understanding and agreement with the above stated treatment plan.     CC: DO Susie Day PA-C  [facility]  [phone]    Dr. Bacon was available during today's encounter.

## 2023-03-15 ENCOUNTER — TELEPHONE (OUTPATIENT)
Dept: NEUROLOGY | Facility: CLINIC | Age: 28
End: 2023-03-15
Payer: MEDICAID

## 2023-03-15 NOTE — TELEPHONE ENCOUNTER
Called Pt to confirm the reason for his appt with Anton[facility]. Pt was scheduled incorrectly. He needs to be seen for Tinnitus, unspecified laterality. [facility] does not treat that. I told him that his appt with Antonette would be canceled. I have sent a message to the navigator to reach out to the Pt to schedule him correctly. Pt was understanding.

## 2023-04-26 ENCOUNTER — OFFICE VISIT (OUTPATIENT)
Dept: INTERNAL MEDICINE | Facility: CLINIC | Age: 28
End: 2023-04-26
Payer: MEDICAID

## 2023-04-26 VITALS
OXYGEN SATURATION: 99 % | RESPIRATION RATE: 20 BRPM | TEMPERATURE: 97 F | HEIGHT: 68 IN | BODY MASS INDEX: 20.52 KG/M2 | WEIGHT: 135.38 LBS | DIASTOLIC BLOOD PRESSURE: 62 MMHG | SYSTOLIC BLOOD PRESSURE: 102 MMHG | HEART RATE: 74 BPM

## 2023-04-26 DIAGNOSIS — Z00.00 ANNUAL PHYSICAL EXAM: Primary | ICD-10-CM

## 2023-04-26 DIAGNOSIS — G43.009 MIGRAINE WITHOUT AURA AND WITHOUT STATUS MIGRAINOSUS, NOT INTRACTABLE: ICD-10-CM

## 2023-04-26 PROCEDURE — 3074F SYST BP LT 130 MM HG: CPT | Mod: CPTII,,, | Performed by: INTERNAL MEDICINE

## 2023-04-26 PROCEDURE — 99999 PR PBB SHADOW E&M-EST. PATIENT-LVL III: CPT | Mod: PBBFAC,,, | Performed by: INTERNAL MEDICINE

## 2023-04-26 PROCEDURE — 99213 OFFICE O/P EST LOW 20 MIN: CPT | Mod: PBBFAC,PO | Performed by: INTERNAL MEDICINE

## 2023-04-26 PROCEDURE — 1159F MED LIST DOCD IN RCRD: CPT | Mod: CPTII,,, | Performed by: INTERNAL MEDICINE

## 2023-04-26 PROCEDURE — 99395 PREV VISIT EST AGE 18-39: CPT | Mod: S$PBB,,, | Performed by: INTERNAL MEDICINE

## 2023-04-26 PROCEDURE — 3078F PR MOST RECENT DIASTOLIC BLOOD PRESSURE < 80 MM HG: ICD-10-PCS | Mod: CPTII,,, | Performed by: INTERNAL MEDICINE

## 2023-04-26 PROCEDURE — 3008F BODY MASS INDEX DOCD: CPT | Mod: CPTII,,, | Performed by: INTERNAL MEDICINE

## 2023-04-26 PROCEDURE — 99395 PR PREVENTIVE VISIT,EST,18-39: ICD-10-PCS | Mod: S$PBB,,, | Performed by: INTERNAL MEDICINE

## 2023-04-26 PROCEDURE — 3074F PR MOST RECENT SYSTOLIC BLOOD PRESSURE < 130 MM HG: ICD-10-PCS | Mod: CPTII,,, | Performed by: INTERNAL MEDICINE

## 2023-04-26 PROCEDURE — 3008F PR BODY MASS INDEX (BMI) DOCUMENTED: ICD-10-PCS | Mod: CPTII,,, | Performed by: INTERNAL MEDICINE

## 2023-04-26 PROCEDURE — 1159F PR MEDICATION LIST DOCUMENTED IN MEDICAL RECORD: ICD-10-PCS | Mod: CPTII,,, | Performed by: INTERNAL MEDICINE

## 2023-04-26 PROCEDURE — 1160F PR REVIEW ALL MEDS BY PRESCRIBER/CLIN PHARMACIST DOCUMENTED: ICD-10-PCS | Mod: CPTII,,, | Performed by: INTERNAL MEDICINE

## 2023-04-26 PROCEDURE — 99999 PR PBB SHADOW E&M-EST. PATIENT-LVL III: ICD-10-PCS | Mod: PBBFAC,,, | Performed by: INTERNAL MEDICINE

## 2023-04-26 PROCEDURE — 3078F DIAST BP <80 MM HG: CPT | Mod: CPTII,,, | Performed by: INTERNAL MEDICINE

## 2023-04-26 PROCEDURE — 1160F RVW MEDS BY RX/DR IN RCRD: CPT | Mod: CPTII,,, | Performed by: INTERNAL MEDICINE

## 2023-04-26 NOTE — PROGRESS NOTES
Subjective     Patient ID: Patel Ghotra is a 27 y.o. male.    Chief Complaint: Annual Exam    HPI  27 y.o. Male here for annual exam.      Vaccines: Influenza (2020); Tetanus (2015)  Eye exam: declined     Exercise: walks  Diet: regular      Past Medical History:  No date: Dental infection  No date: Migraine with aura and without status migrainosus, not   intractable  No date: Palpitations      Comment:  patient reports related to anxiety  No date: Urinary hesitancy  Past Surgical History:  6/23/2021: CYSTOSCOPY; N/A      Comment:  Procedure: CYSTOSCOPY;  Surgeon: Jenn Tapia MD;  Location: [facility];  Service: Urology;  Laterality:                N/A;  No date: WISDOM TOOTH EXTRACTION  Social History    Socioeconomic History      Marital status: Single    Occupational History      Occupation: Student    Tobacco Use      Smoking status: Former Smoker        Types: Cigarettes        Quit date: 11/11/2018        Years since quitting: 3.6      Smokeless tobacco: Never Used      Tobacco comment: every other day    Substance and Sexual Activity      Alcohol use: Yes        Alcohol/week: 0.0 standard drinks        Comment: social      Drug use: Yes        Frequency: 20.0 times per week        Types: Marijuana        Comment: joints per week      Sexual activity: Not Currently        Partners: Female        Birth control/protection: Condom     Social Determinants of Health  Financial Resource Strain: High Risk      Difficulty of Paying Living Expenses: Hard  Food Insecurity: Food Insecurity Present      Worried About Running Out of Food in the Last Year: Often true      Ran Out of Food in the Last Year: Sometimes true  Transportation Needs: Unmet Transportation Needs      Lack of Transportation (Medical): Yes      Lack of Transportation (Non-Medical): Yes  Physical Activity: Insufficiently Active      Days of Exercise per Week: 2 days      Minutes of Exercise per Session: 60 min  Stress:  Stress Concern Present      Feeling of Stress : To some extent  Social Connections: Unknown      Frequency of Communication with Friends and Family: Patient refused      Frequency of Social Gatherings with Friends and Family: Patient refused      Active Member of Clubs or Organizations: No      Attends Club or Organization Meetings: Patient refused      Marital Status: Never   Housing Stability: Unknown      Unable to Pay for Housing in the Last Year: Patient refused      Number of Places Lived in the Last Year: 1      Unstable Housing in the Last Year: Patient refused  Review of patient's allergies indicates:   -- Amoxicillin -- Rash  Review of Systems   Constitutional:  Negative for activity change, appetite change, chills, diaphoresis, fatigue, fever and unexpected weight change.   HENT:  Negative for postnasal drip, rhinorrhea, sinus pressure/congestion, sneezing, sore throat, trouble swallowing and voice change.    Respiratory:  Negative for cough, shortness of breath and wheezing.    Cardiovascular:  Negative for chest pain, palpitations and leg swelling.   Gastrointestinal:  Negative for abdominal pain, blood in stool, constipation, diarrhea, nausea and vomiting.   Genitourinary:  Negative for dysuria.   Musculoskeletal:  Negative for arthralgias and myalgias.   Integumentary:  Negative for rash and wound.   Allergic/Immunologic: Negative for environmental allergies and food allergies.   Neurological:  Positive for headaches.   Hematological:  Negative for adenopathy. Does not bruise/bleed easily.        Objective     Physical Exam  Constitutional:       General: He is not in acute distress.     Appearance: Normal appearance. He is well-developed. He is not diaphoretic.   HENT:      Head: Normocephalic and atraumatic.      Right Ear: External ear normal.      Left Ear: External ear normal.      Nose: Nose normal.      Mouth/Throat:      Pharynx: No oropharyngeal exudate.   Eyes:      General: No  scleral icterus.        Right eye: No discharge.         Left eye: No discharge.      Conjunctiva/sclera: Conjunctivae normal.      Pupils: Pupils are equal, round, and reactive to light.   Neck:      Vascular: No JVD.   Cardiovascular:      Rate and Rhythm: Normal rate and regular rhythm.      Pulses: Normal pulses.      Heart sounds: Normal heart sounds. No murmur heard.  Pulmonary:      Effort: Pulmonary effort is normal. No respiratory distress.      Breath sounds: Normal breath sounds. No wheezing or rales.   Abdominal:      General: Bowel sounds are normal.      Tenderness: There is no abdominal tenderness. There is no guarding or rebound.   Musculoskeletal:      Cervical back: Normal range of motion and neck supple.      Right lower leg: No edema.      Left lower leg: No edema.   Lymphadenopathy:      Cervical: No cervical adenopathy.   Skin:     General: Skin is warm and dry.      Capillary Refill: Capillary refill takes less than 2 seconds.      Coloration: Skin is not pale.      Findings: No rash.   Neurological:      Mental Status: He is alert and oriented to person, place, and time. Mental status is at baseline.      Cranial Nerves: No cranial nerve deficit.   Psychiatric:         Mood and Affect: Mood normal.         Behavior: Behavior normal.          Assessment and Plan     Problem List Items Addressed This Visit          Neuro    Migraine without aura and without status migrainosus, not intractable     Other Visit Diagnoses       Annual physical exam    -  Primary    Relevant Orders    CBC Auto Differential    Comprehensive Metabolic Panel    TSH    Lipid Panel    Urinalysis         Blood work ordered       Migraines- stable on Maxalt PRN      F/u in 1 yr

## 2023-05-01 ENCOUNTER — LAB VISIT (OUTPATIENT)
Dept: LAB | Facility: HOSPITAL | Age: 28
End: 2023-05-01
Attending: INTERNAL MEDICINE
Payer: MEDICAID

## 2023-05-01 DIAGNOSIS — Z00.00 ANNUAL PHYSICAL EXAM: ICD-10-CM

## 2023-05-01 LAB
ALBUMIN SERPL BCP-MCNC: 4.8 G/DL (ref 3.5–5.2)
ALP SERPL-CCNC: 84 U/L (ref 55–135)
ALT SERPL W/O P-5'-P-CCNC: 20 U/L (ref 10–44)
ANION GAP SERPL CALC-SCNC: 10 MMOL/L (ref 8–16)
AST SERPL-CCNC: 27 U/L (ref 10–40)
BASOPHILS # BLD AUTO: 0.04 K/UL (ref 0–0.2)
BASOPHILS NFR BLD: 0.9 % (ref 0–1.9)
BILIRUB SERPL-MCNC: 0.7 MG/DL (ref 0.1–1)
BUN SERPL-MCNC: 10 MG/DL (ref 6–20)
CALCIUM SERPL-MCNC: 10.1 MG/DL (ref 8.7–10.5)
CHLORIDE SERPL-SCNC: 103 MMOL/L (ref 95–110)
CHOLEST SERPL-MCNC: 202 MG/DL (ref 120–199)
CHOLEST/HDLC SERPL: 3.7 {RATIO} (ref 2–5)
CO2 SERPL-SCNC: 26 MMOL/L (ref 23–29)
CREAT SERPL-MCNC: 1 MG/DL (ref 0.5–1.4)
DIFFERENTIAL METHOD: NORMAL
EOSINOPHIL # BLD AUTO: 0.1 K/UL (ref 0–0.5)
EOSINOPHIL NFR BLD: 2.3 % (ref 0–8)
ERYTHROCYTE [DISTWIDTH] IN BLOOD BY AUTOMATED COUNT: 12.2 % (ref 11.5–14.5)
EST. GFR  (NO RACE VARIABLE): >60 ML/MIN/1.73 M^2
GLUCOSE SERPL-MCNC: 105 MG/DL (ref 70–110)
HCT VFR BLD AUTO: 46.3 % (ref 40–54)
HDLC SERPL-MCNC: 55 MG/DL (ref 40–75)
HDLC SERPL: 27.2 % (ref 20–50)
HGB BLD-MCNC: 15.4 G/DL (ref 14–18)
IMM GRANULOCYTES # BLD AUTO: 0 K/UL (ref 0–0.04)
IMM GRANULOCYTES NFR BLD AUTO: 0 % (ref 0–0.5)
LDLC SERPL CALC-MCNC: 133.6 MG/DL (ref 63–159)
LYMPHOCYTES # BLD AUTO: 2 K/UL (ref 1–4.8)
LYMPHOCYTES NFR BLD: 45.8 % (ref 18–48)
MCH RBC QN AUTO: 29.7 PG (ref 27–31)
MCHC RBC AUTO-ENTMCNC: 33.3 G/DL (ref 32–36)
MCV RBC AUTO: 89 FL (ref 82–98)
MONOCYTES # BLD AUTO: 0.3 K/UL (ref 0.3–1)
MONOCYTES NFR BLD: 7 % (ref 4–15)
NEUTROPHILS # BLD AUTO: 1.9 K/UL (ref 1.8–7.7)
NEUTROPHILS NFR BLD: 44 % (ref 38–73)
NONHDLC SERPL-MCNC: 147 MG/DL
NRBC BLD-RTO: 0 /100 WBC
PLATELET # BLD AUTO: 278 K/UL (ref 150–450)
PMV BLD AUTO: 10 FL (ref 9.2–12.9)
POTASSIUM SERPL-SCNC: 4.1 MMOL/L (ref 3.5–5.1)
PROT SERPL-MCNC: 8 G/DL (ref 6–8.4)
RBC # BLD AUTO: 5.18 M/UL (ref 4.6–6.2)
SODIUM SERPL-SCNC: 139 MMOL/L (ref 136–145)
TRIGL SERPL-MCNC: 67 MG/DL (ref 30–150)
TSH SERPL DL<=0.005 MIU/L-ACNC: 0.76 UIU/ML (ref 0.4–4)
WBC # BLD AUTO: 4.26 K/UL (ref 3.9–12.7)

## 2023-05-01 PROCEDURE — 80053 COMPREHEN METABOLIC PANEL: CPT | Performed by: INTERNAL MEDICINE

## 2023-05-01 PROCEDURE — 36415 COLL VENOUS BLD VENIPUNCTURE: CPT | Mod: PO | Performed by: INTERNAL MEDICINE

## 2023-05-01 PROCEDURE — 80061 LIPID PANEL: CPT | Performed by: INTERNAL MEDICINE

## 2023-05-01 PROCEDURE — 85025 COMPLETE CBC W/AUTO DIFF WBC: CPT | Performed by: INTERNAL MEDICINE

## 2023-05-01 PROCEDURE — 84443 ASSAY THYROID STIM HORMONE: CPT | Performed by: INTERNAL MEDICINE

## 2023-05-23 ENCOUNTER — PATIENT MESSAGE (OUTPATIENT)
Dept: INTERNAL MEDICINE | Facility: CLINIC | Age: 28
End: 2023-05-23
Payer: MEDICAID

## 2023-05-24 ENCOUNTER — PATIENT MESSAGE (OUTPATIENT)
Dept: INTERNAL MEDICINE | Facility: CLINIC | Age: 28
End: 2023-05-24
Payer: MEDICAID

## 2023-05-24 DIAGNOSIS — H93.11 TINNITUS OF RIGHT EAR: Primary | ICD-10-CM

## 2023-05-25 ENCOUNTER — PATIENT MESSAGE (OUTPATIENT)
Dept: INTERNAL MEDICINE | Facility: CLINIC | Age: 28
End: 2023-05-25
Payer: MEDICAID

## 2023-09-23 ENCOUNTER — IMMUNIZATION (OUTPATIENT)
Dept: INTERNAL MEDICINE | Facility: CLINIC | Age: 28
End: 2023-09-23
Payer: MEDICAID

## 2023-09-23 PROCEDURE — 90686 IIV4 VACC NO PRSV 0.5 ML IM: CPT | Mod: PBBFAC

## 2023-09-23 PROCEDURE — 99999PBSHW FLU VACCINE (QUAD) GREATER THAN OR EQUAL TO 3YO PRESERVATIVE FREE IM: Mod: PBBFAC,,,

## 2023-09-23 PROCEDURE — 99999PBSHW FLU VACCINE (QUAD) GREATER THAN OR EQUAL TO 3YO PRESERVATIVE FREE IM: ICD-10-PCS | Mod: PBBFAC,,,

## 2023-11-04 ENCOUNTER — PATIENT MESSAGE (OUTPATIENT)
Dept: ADMINISTRATIVE | Facility: OTHER | Age: 28
End: 2023-11-04
Payer: MEDICAID

## 2024-06-17 ENCOUNTER — TELEPHONE (OUTPATIENT)
Dept: INTERNAL MEDICINE | Facility: CLINIC | Age: 29
End: 2024-06-17
Payer: MEDICAID

## 2024-06-17 DIAGNOSIS — Z00.00 ANNUAL PHYSICAL EXAM: Primary | ICD-10-CM

## 2024-06-24 ENCOUNTER — OFFICE VISIT (OUTPATIENT)
Dept: INTERNAL MEDICINE | Facility: CLINIC | Age: 29
End: 2024-06-24
Payer: MEDICAID

## 2024-06-24 VITALS
HEART RATE: 86 BPM | HEIGHT: 68 IN | BODY MASS INDEX: 20.18 KG/M2 | DIASTOLIC BLOOD PRESSURE: 62 MMHG | RESPIRATION RATE: 18 BRPM | OXYGEN SATURATION: 98 % | TEMPERATURE: 98 F | WEIGHT: 133.19 LBS | SYSTOLIC BLOOD PRESSURE: 110 MMHG

## 2024-06-24 DIAGNOSIS — F32.A ANXIETY AND DEPRESSION: ICD-10-CM

## 2024-06-24 DIAGNOSIS — Z00.00 ANNUAL PHYSICAL EXAM: Primary | ICD-10-CM

## 2024-06-24 DIAGNOSIS — G43.009 MIGRAINE WITHOUT AURA AND WITHOUT STATUS MIGRAINOSUS, NOT INTRACTABLE: ICD-10-CM

## 2024-06-24 DIAGNOSIS — F41.9 ANXIETY AND DEPRESSION: ICD-10-CM

## 2024-06-24 PROCEDURE — 1159F MED LIST DOCD IN RCRD: CPT | Mod: CPTII,,, | Performed by: INTERNAL MEDICINE

## 2024-06-24 PROCEDURE — 1160F RVW MEDS BY RX/DR IN RCRD: CPT | Mod: CPTII,,, | Performed by: INTERNAL MEDICINE

## 2024-06-24 PROCEDURE — 99395 PREV VISIT EST AGE 18-39: CPT | Mod: S$PBB,,, | Performed by: INTERNAL MEDICINE

## 2024-06-24 PROCEDURE — 99213 OFFICE O/P EST LOW 20 MIN: CPT | Mod: PBBFAC,PO | Performed by: INTERNAL MEDICINE

## 2024-06-24 PROCEDURE — 99999 PR PBB SHADOW E&M-EST. PATIENT-LVL III: CPT | Mod: PBBFAC,,, | Performed by: INTERNAL MEDICINE

## 2024-06-24 PROCEDURE — 3008F BODY MASS INDEX DOCD: CPT | Mod: CPTII,,, | Performed by: INTERNAL MEDICINE

## 2024-06-24 PROCEDURE — 3078F DIAST BP <80 MM HG: CPT | Mod: CPTII,,, | Performed by: INTERNAL MEDICINE

## 2024-06-24 PROCEDURE — 3074F SYST BP LT 130 MM HG: CPT | Mod: CPTII,,, | Performed by: INTERNAL MEDICINE

## 2024-06-24 RX ORDER — PROPRANOLOL HYDROCHLORIDE 10 MG/1
10 TABLET ORAL 3 TIMES DAILY PRN
Qty: 90 TABLET | Refills: 0 | Status: SHIPPED | OUTPATIENT
Start: 2024-06-24

## 2024-06-24 RX ORDER — BUPROPION HYDROCHLORIDE 300 MG/1
300 TABLET ORAL
COMMUNITY

## 2024-06-24 RX ORDER — ARIPIPRAZOLE 5 MG/1
TABLET ORAL
COMMUNITY
Start: 2024-05-16

## 2024-06-24 NOTE — PROGRESS NOTES
Subjective     Patient ID: Patel Ghotra is a 28 y.o. male.    Chief Complaint: Annual Exam    HPI  28 y.o. Male here for annual exam.      Vaccines: Influenza (2020); Tetanus (2015)  Eye exam: declined     Exercise: walks  Diet: regular      Past Medical History:  No date: Dental infection  No date: Migraine with aura and without status migrainosus, not   intractable  No date: Palpitations      Comment:  patient reports related to anxiety  No date: Urinary hesitancy  Past Surgical History:  6/23/2021: CYSTOSCOPY; N/A      Comment:  Procedure: CYSTOSCOPY;  Surgeon: Jenn Tapia MD;  Location: [facility];  Service: Urology;  Laterality:                N/A;  No date: WISDOM TOOTH EXTRACTION  Social History    Socioeconomic History      Marital status: Single    Occupational History      Occupation: Student    Tobacco Use      Smoking status: Former Smoker        Types: Cigarettes        Quit date: 11/11/2018        Years since quitting: 3.6      Smokeless tobacco: Never Used      Tobacco comment: every other day    Substance and Sexual Activity      Alcohol use: Yes        Alcohol/week: 0.0 standard drinks        Comment: social      Drug use: Yes        Frequency: 20.0 times per week        Types: Marijuana        Comment: joints per week      Sexual activity: Not Currently        Partners: Female        Birth control/protection: Condom     Social Determinants of Health  Financial Resource Strain: High Risk      Difficulty of Paying Living Expenses: Hard  Food Insecurity: Food Insecurity Present      Worried About Running Out of Food in the Last Year: Often true      Ran Out of Food in the Last Year: Sometimes true  Transportation Needs: Unmet Transportation Needs      Lack of Transportation (Medical): Yes      Lack of Transportation (Non-Medical): Yes  Physical Activity: Insufficiently Active      Days of Exercise per Week: 2 days      Minutes of Exercise per Session: 60 min  Stress:  Stress Concern Present      Feeling of Stress : To some extent  Social Connections: Unknown      Frequency of Communication with Friends and Family: Patient refused      Frequency of Social Gatherings with Friends and Family: Patient refused      Active Member of Clubs or Organizations: No      Attends Club or Organization Meetings: Patient refused      Marital Status: Never   Housing Stability: Unknown      Unable to Pay for Housing in the Last Year: Patient refused      Number of Places Lived in the Last Year: 1      Unstable Housing in the Last Year: Patient refused  Review of patient's allergies indicates:   -- Amoxicillin -- Rash  Review of Systems   Constitutional:  Negative for activity change, appetite change, chills, diaphoresis, fatigue, fever and unexpected weight change.   HENT:  Negative for nasal congestion, mouth sores, postnasal drip, rhinorrhea, sinus pressure/congestion, sneezing, sore throat, trouble swallowing and voice change.    Eyes:  Negative for discharge, itching and visual disturbance.   Respiratory:  Negative for cough, chest tightness, shortness of breath and wheezing.    Cardiovascular:  Negative for chest pain, palpitations and leg swelling.   Gastrointestinal:  Negative for abdominal pain, blood in stool, constipation, diarrhea, nausea and vomiting.   Endocrine: Negative for cold intolerance and heat intolerance.   Genitourinary:  Negative for difficulty urinating, dysuria, flank pain, hematuria and urgency.   Musculoskeletal:  Negative for arthralgias, back pain, myalgias and neck pain.   Integumentary:  Negative for rash and wound.   Allergic/Immunologic: Negative for environmental allergies and food allergies.   Neurological:  Positive for headaches. Negative for dizziness, tremors, seizures, syncope and weakness.   Hematological:  Negative for adenopathy. Does not bruise/bleed easily.   Psychiatric/Behavioral:  Positive for dysphoric mood. Negative for confusion,  self-injury, sleep disturbance and suicidal ideas. The patient is nervous/anxious.           Objective     Physical Exam  Constitutional:       General: He is not in acute distress.     Appearance: Normal appearance. He is well-developed. He is not ill-appearing, toxic-appearing or diaphoretic.   HENT:      Head: Normocephalic and atraumatic.      Right Ear: External ear normal.      Left Ear: External ear normal.      Nose: Nose normal.      Mouth/Throat:      Pharynx: No oropharyngeal exudate.   Eyes:      General: No scleral icterus.        Right eye: No discharge.         Left eye: No discharge.      Extraocular Movements: Extraocular movements intact.      Conjunctiva/sclera: Conjunctivae normal.      Pupils: Pupils are equal, round, and reactive to light.   Neck:      Thyroid: No thyromegaly.      Vascular: No JVD.   Cardiovascular:      Rate and Rhythm: Normal rate and regular rhythm.      Pulses: Normal pulses.      Heart sounds: Normal heart sounds. No murmur heard.  Pulmonary:      Effort: Pulmonary effort is normal. No respiratory distress.      Breath sounds: Normal breath sounds. No wheezing or rales.   Abdominal:      General: Bowel sounds are normal. There is no distension.      Palpations: Abdomen is soft.      Tenderness: There is no abdominal tenderness. There is no right CVA tenderness, left CVA tenderness, guarding or rebound.   Musculoskeletal:      Cervical back: Normal range of motion and neck supple. No rigidity.      Right lower leg: No edema.      Left lower leg: No edema.   Lymphadenopathy:      Cervical: No cervical adenopathy.   Skin:     General: Skin is warm and dry.      Capillary Refill: Capillary refill takes less than 2 seconds.      Coloration: Skin is not pale.      Findings: No rash.   Neurological:      General: No focal deficit present.      Mental Status: He is alert and oriented to person, place, and time. Mental status is at baseline.      Cranial Nerves: No cranial nerve  deficit.      Sensory: No sensory deficit.      Motor: No weakness.      Coordination: Coordination normal.      Gait: Gait normal.      Deep Tendon Reflexes: Reflexes normal.   Psychiatric:         Mood and Affect: Mood normal.         Behavior: Behavior normal.         Thought Content: Thought content normal.         Judgment: Judgment normal.            Assessment and Plan     1. Annual physical exam    2. Migraine without aura and without status migrainosus, not intractable    3. Anxiety and depression  -     propranoloL (INDERAL) 10 MG tablet; Take 1 tablet (10 mg total) by mouth 3 (three) times daily as needed (anxiety/tremors).  Dispense: 90 tablet; Refill: 0        Outside blood work reviewed and is stable      Migraines- stable on Maxalt PRN    Anxiety/Depression- fair control on Wellbutrin/Abilify   -Rx Propranolol PRN tremors/anxiety     F/u in 1 yr

## 2024-10-01 ENCOUNTER — PATIENT MESSAGE (OUTPATIENT)
Dept: INTERNAL MEDICINE | Facility: CLINIC | Age: 29
End: 2024-10-01
Payer: MEDICAID

## 2024-12-30 ENCOUNTER — TELEPHONE (OUTPATIENT)
Dept: INTERNAL MEDICINE | Facility: CLINIC | Age: 29
End: 2024-12-30
Payer: MEDICAID

## 2025-01-10 ENCOUNTER — TELEPHONE (OUTPATIENT)
Dept: INTERNAL MEDICINE | Facility: CLINIC | Age: 30
End: 2025-01-10
Payer: MEDICAID

## 2025-01-10 NOTE — TELEPHONE ENCOUNTER
----- Message from Marquez Dmaon sent at 1/6/2025  4:05 PM CST -----  Regarding: schedule appt  schedule appt  ----- Message -----  From: Nell Almazan  Sent: 1/6/2025   1:47 PM CST  To: [facility] Staff    No blue slot available to schedule an appointment for the patient.  Patient is established with which PCP: Royce   Reason for the visit:  sob w/exertion  Would the patient like a call back, or a response through their [facility] portal?:  Call Back

## 2025-05-13 NOTE — TELEPHONE ENCOUNTER
LVM for pt to see if he was in route to schedule appointment or if he needed to reschedule.   Speaking Coherently

## (undated) DEVICE — SET CYSTO IRRIGATION UNIV SPIK

## (undated) DEVICE — BAG LINGEMAN DRAIN UROLOGY

## (undated) DEVICE — SOL 9P NACL IRR PIC IL

## (undated) DEVICE — GLOVE BIOGEL SKINSENSE PI 6.5

## (undated) DEVICE — SPONGE COTTON TRAY 4X4IN

## (undated) DEVICE — BRUSH SCRUB SURGICALW/BETADINE

## (undated) DEVICE — Device

## (undated) DEVICE — SOL IRR NACL .9% 3000ML

## (undated) DEVICE — SOL NS 1000CC